# Patient Record
Sex: FEMALE | Race: OTHER | HISPANIC OR LATINO
[De-identification: names, ages, dates, MRNs, and addresses within clinical notes are randomized per-mention and may not be internally consistent; named-entity substitution may affect disease eponyms.]

---

## 2017-03-24 ENCOUNTER — APPOINTMENT (OUTPATIENT)
Dept: OPHTHALMOLOGY | Facility: CLINIC | Age: 57
End: 2017-03-24

## 2017-03-24 DIAGNOSIS — H35.012: ICD-10-CM

## 2017-03-24 DIAGNOSIS — H11.31 CONJUNCTIVAL HEMORRHAGE, RIGHT EYE: ICD-10-CM

## 2017-11-10 ENCOUNTER — RESULT REVIEW (OUTPATIENT)
Age: 57
End: 2017-11-10

## 2018-11-20 ENCOUNTER — RESULT REVIEW (OUTPATIENT)
Age: 58
End: 2018-11-20

## 2019-06-21 ENCOUNTER — EMERGENCY (EMERGENCY)
Facility: HOSPITAL | Age: 59
LOS: 1 days | Discharge: HOME | End: 2019-06-21
Attending: EMERGENCY MEDICINE
Payer: COMMERCIAL

## 2019-06-21 VITALS — HEIGHT: 65 IN | WEIGHT: 200.18 LBS

## 2019-06-21 VITALS
RESPIRATION RATE: 17 BRPM | SYSTOLIC BLOOD PRESSURE: 147 MMHG | HEART RATE: 105 BPM | WEIGHT: 190.04 LBS | HEIGHT: 64 IN | OXYGEN SATURATION: 96 % | DIASTOLIC BLOOD PRESSURE: 97 MMHG | TEMPERATURE: 98 F

## 2019-06-21 PROCEDURE — 90471 IMMUNIZATION ADMIN: CPT

## 2019-06-21 PROCEDURE — 90675 RABIES VACCINE IM: CPT

## 2019-06-21 PROCEDURE — 90375 RABIES IG IM/SC: CPT

## 2019-06-21 PROCEDURE — 99283 EMERGENCY DEPT VISIT LOW MDM: CPT

## 2019-06-21 PROCEDURE — 96372 THER/PROPH/DIAG INJ SC/IM: CPT

## 2019-06-21 PROCEDURE — 99283 EMERGENCY DEPT VISIT LOW MDM: CPT | Mod: 25

## 2019-06-21 PROCEDURE — 90715 TDAP VACCINE 7 YRS/> IM: CPT

## 2019-06-21 RX ORDER — RABIES VACC, HUMAN DIPLOID/PF 2.5 UNIT
1 VIAL (EA) INTRAMUSCULAR ONCE
Refills: 0 | Status: COMPLETED | OUTPATIENT
Start: 2019-06-21 | End: 2019-06-21

## 2019-06-21 RX ORDER — TETANUS TOXOID, REDUCED DIPHTHERIA TOXOID AND ACELLULAR PERTUSSIS VACCINE, ADSORBED 5; 2.5; 8; 8; 2.5 [IU]/.5ML; [IU]/.5ML; UG/.5ML; UG/.5ML; UG/.5ML
0.5 SUSPENSION INTRAMUSCULAR ONCE
Refills: 0 | Status: COMPLETED | OUTPATIENT
Start: 2019-06-21 | End: 2019-06-21

## 2019-06-21 RX ORDER — HUMAN RABIES VIRUS IMMUNE GLOBULIN 150 [IU]/ML
1816 INJECTION, SOLUTION INTRAMUSCULAR ONCE
Refills: 0 | Status: COMPLETED | OUTPATIENT
Start: 2019-06-21 | End: 2019-06-21

## 2019-06-21 RX ADMIN — TETANUS TOXOID, REDUCED DIPHTHERIA TOXOID AND ACELLULAR PERTUSSIS VACCINE, ADSORBED 0.5 MILLILITER(S): 5; 2.5; 8; 8; 2.5 SUSPENSION INTRAMUSCULAR at 19:45

## 2019-06-21 RX ADMIN — Medication 1 TABLET(S): at 19:44

## 2019-06-21 RX ADMIN — HUMAN RABIES VIRUS IMMUNE GLOBULIN 1816 UNIT(S): 150 INJECTION, SOLUTION INTRAMUSCULAR at 20:24

## 2019-06-21 RX ADMIN — Medication 1 MILLILITER(S): at 19:47

## 2019-06-21 NOTE — ED PROVIDER NOTE - PHYSICAL EXAMINATION
Attending note. Patient is alert and in no acute distress. Patient has a superficial laceration of the tip of the right fourth finger. There is no swelling, erythema. There is minimal tenderness. There is no ascending lymphangitis. She has full range of motion of the digit.

## 2019-06-21 NOTE — ED ADULT NURSE NOTE - OBJECTIVE STATEMENT
59F aaox4 ambulatory with h/o RA on Enbrel Methotrexate, Folic acid and aciphex and Lexapro p/w an animal bite on her rt 4th finger an hour ago by a stray kitten. Reports it was bleeding but eventually stopped. Reports that she wash her hands and applied alcohol and hydrogen peroxide. Unknown Tetanus vaccine status.

## 2019-06-21 NOTE — ED PROVIDER NOTE - NSFOLLOWUPINSTRUCTIONS_ED_ALL_ED_FT
Return next Monday for rabies vaccination #2. Return next Friday for rabies vaccination #3, and return the following Friday for rabies vaccines #4-the final vaccines. Take Augmentin as prescribed. Return to the emergency department if increased redness, swelling or redness streaks up the right hand. Tylenol or ibuprofen for pain if needed. Keep hand elevated for the next 2 days.  Return to the emergency department for a wound check if concerns for infection.

## 2019-06-21 NOTE — ED PROVIDER NOTE - OBJECTIVE STATEMENT
Attending note. Patient was seen and last track room #3. Patient was bitten on the tip of the right ring finger. Patient states she clean the wound with water, soap, peroxide, alcohol. Patient was seen at her  center and sent to the emergency department for rabies prophylaxis. Patient states her tetanus is up-to-date. She has no allergies. Bite occurred in [place].

## 2019-06-21 NOTE — ED PROVIDER NOTE - CLINICAL SUMMARY MEDICAL DECISION MAKING FREE TEXT BOX
Attending note. CAT bite right fourth finger tip. Rabies immunoglobulin and rabies vaccines. Tetanus is up-to-date. Augmentin. Vaccination schedule was discussed with the patient.

## 2019-06-24 ENCOUNTER — EMERGENCY (EMERGENCY)
Facility: HOSPITAL | Age: 59
LOS: 1 days | Discharge: HOME | End: 2019-06-24
Attending: EMERGENCY MEDICINE
Payer: COMMERCIAL

## 2019-06-24 VITALS
HEIGHT: 64 IN | TEMPERATURE: 98 F | DIASTOLIC BLOOD PRESSURE: 72 MMHG | RESPIRATION RATE: 20 BRPM | SYSTOLIC BLOOD PRESSURE: 134 MMHG | HEART RATE: 100 BPM | WEIGHT: 198.42 LBS | OXYGEN SATURATION: 97 %

## 2019-06-24 PROCEDURE — 99283 EMERGENCY DEPT VISIT LOW MDM: CPT

## 2019-06-24 PROCEDURE — 90471 IMMUNIZATION ADMIN: CPT

## 2019-06-24 PROCEDURE — 99284 EMERGENCY DEPT VISIT MOD MDM: CPT | Mod: 25

## 2019-06-24 PROCEDURE — 90675 RABIES VACCINE IM: CPT

## 2019-06-24 RX ORDER — RABIES VACC, HUMAN DIPLOID/PF 2.5 UNIT
1 VIAL (EA) INTRAMUSCULAR ONCE
Refills: 0 | Status: COMPLETED | OUTPATIENT
Start: 2019-06-24 | End: 2019-06-24

## 2019-06-24 RX ADMIN — Medication 1 MILLILITER(S): at 17:42

## 2019-06-24 NOTE — ED PROVIDER NOTE - NSFOLLOWUPINSTRUCTIONS_ED_ALL_ED_FT
REst, increase activity as tolerated  REturn to the ER FRIDAY for thrid vaccine and then the next FRIDAY\  -- Please use 650mg Tylenol (also called acetaminophen) every 4 hours & 600mg Motrin (also called Advil or ibuprofen) every 6 hours as needed for pain/discomfort/swelling. You can get these without a prescription. Don't use more than 3500mg of Tylenol in any 24-hour period. Make sure your other prescription/over-the-counter medications don't contain any Tylenol so you don't take too much. If you have any stomach discomfort while taking Motrin, you can use TUMS or Pepcid or Zantac (these can all be bought without a prescription).

## 2019-06-24 NOTE — ED PROVIDER NOTE - CLINICAL SUMMARY MEDICAL DECISION MAKING FREE TEXT BOX
s/p cat bite 6/21-   pt here for 2/4 of rabies vaccine- no s/s of infection   REturn friday for 3/4 s/p cat bite 6/21-   pt here for 2/4 of rabies vaccine- no s/s of infection   REturn friday for 3/4    Irma: See attending statement below

## 2019-06-24 NOTE — ED PROVIDER NOTE - ATTENDING CONTRIBUTION TO CARE
59y F here for cat bite/rabies FU. Pt seen 6/21 after being bitten by stray cat on R ring finger. Pt received rabies vaccine, immunoglobulin, abx, Tdap. Here today for rabies vaccine 2/4. No si or sx of infection. Still taking abx. To return for shot 3/4 on Friday.

## 2019-06-24 NOTE — ED ADULT NURSE NOTE - OBJECTIVE STATEMENT
Pt presents for next Rabies vaccination after sustaining a bite from a stray cat last week to the tip of her left 4th finger..  Denies fevers, chills.

## 2019-06-25 PROBLEM — M06.9 RHEUMATOID ARTHRITIS, UNSPECIFIED: Chronic | Status: ACTIVE | Noted: 2019-06-21

## 2019-06-28 ENCOUNTER — EMERGENCY (EMERGENCY)
Facility: HOSPITAL | Age: 59
LOS: 1 days | Discharge: HOME | End: 2019-06-28
Attending: EMERGENCY MEDICINE
Payer: COMMERCIAL

## 2019-06-28 VITALS
HEIGHT: 64 IN | TEMPERATURE: 98 F | SYSTOLIC BLOOD PRESSURE: 140 MMHG | WEIGHT: 190.04 LBS | DIASTOLIC BLOOD PRESSURE: 83 MMHG | OXYGEN SATURATION: 95 % | RESPIRATION RATE: 16 BRPM | HEART RATE: 96 BPM

## 2019-06-28 PROCEDURE — 99283 EMERGENCY DEPT VISIT LOW MDM: CPT | Mod: 25

## 2019-06-28 PROCEDURE — 90675 RABIES VACCINE IM: CPT

## 2019-06-28 PROCEDURE — 90471 IMMUNIZATION ADMIN: CPT

## 2019-06-28 PROCEDURE — 99282 EMERGENCY DEPT VISIT SF MDM: CPT

## 2019-06-28 RX ORDER — RABIES VACC, HUMAN DIPLOID/PF 2.5 UNIT
1 VIAL (EA) INTRAMUSCULAR ONCE
Refills: 0 | Status: COMPLETED | OUTPATIENT
Start: 2019-06-28 | End: 2019-06-28

## 2019-06-28 RX ADMIN — Medication 1 MILLILITER(S): at 09:29

## 2019-06-28 NOTE — ED PROVIDER NOTE - PHYSICAL EXAMINATION
**ATTENDING ADDENDUM (Dr. Alberto Gaines): I have reviewed and substantially contributed to the elements of the PE as documented above. I have directly performed an examination of this patient in conjunction with the other members (EM resident/PA/NP) of the patient care team. Of note, and in addition to the above, there is full range of motion of the right ring finger (flexion, extension). NO expanding erythema or warmth. NO effusion or soft-tissue swelling. NO "sausage-shaped" soft-tissue swelling. NO pain with range of motion or with passive extension. NO tenderness along the flexor tendon sheath. NO distal discoloration.

## 2019-06-28 NOTE — ED PROVIDER NOTE - PROGRESS NOTE DETAILS
**ATTENDING ADDENDUM (Dr. Alberto Gaines): Extensive anticipatory guidance provided to patient +/or family member(s) regarding need for next dose of rabies postexposure prophylaxis on day #14 (next Friday). Will continue to observe and monitor closely until vaccination is administered.

## 2019-06-28 NOTE — ED PROVIDER NOTE - CLINICAL SUMMARY MEDICAL DECISION MAKING FREE TEXT BOX
**ATTENDING MEDICAL DECISION MAKING/SYNTHESIS (Dr. Alberto Gaines): I have reviewed the Chief Complaint, the HPI, the ROS, and have directly performed and confirmed the findings on the Physical Examination. I have reviewed the medical decision making with all providers, as applicable. The PROBLEM REPRESENTATION at this time is: 59-year-old right-hand dominant woman one week s/p cat bite from cat (undomesticated) with unknown vaccination status for third dose of rabies vaccine. Received first two doses according to CDC recommendations (day 0 and day 3). Here for day 7 vaccination. Also received amoxicillin-clavulanate oral tablets for one week. NO reported fevers, chills, streaking, soft-tissue swelling, expanding erythema, pain, or tenderness. Right-hand dominant. Bite is a puncture on the volar aspect of the distal tuft of the right ring finger. The MOST LIKELY DIAGNOSIS, and the LIST OF DIFFERENTIAL DIAGNOSES, includes (but is not limited to) the following: worsening bite/infection, flexor tenosynovitis (NO evidence), occult foreign body or fracture (NO evidence), pain (NO evidence), compartment syndrome (NO evidence). The likelihood of each of these diagnoses has been appropriately considered in the context of this patient's presentation and my evaluation. PLAN: as described in EMR, including diagnostics, therapeutics and consultation as clinically warranted. I will continue to reevaluate the patient, including the results of all testing, and monitor response to therapy throughout the patient's course in the ED.

## 2019-06-28 NOTE — ED PROVIDER NOTE - CHIEF COMPLAINT
The patient is a 59y Female for rabies follow up (vaccine #3 of 4 total doses, according to CDC recommendations)

## 2019-06-28 NOTE — ED PROVIDER NOTE - MUSCULOSKELETAL [+], MLM
**ATTENDING ADDENDUM (Dr. Alberto Gaines): POSITIVE history of puncture wound to the volar tuft of the distal right ring finger. NO pain, tenderness, soft-tissue swelling, or streakign reported. Minimal-to-NO erythema reported (proximate to healing puncture site).

## 2019-06-28 NOTE — ED PROVIDER NOTE - OBJECTIVE STATEMENT
**ATTENDING OBJECTIVE STATEMENT (Dr. Alberto Gaines): I have reviewed this note, the presenting symptoms, and the Chief Complaint and the History of Present Illness as documented, with the other care provider(s) and nurses on the patient care team. I have also reviewed this patient's past medical/surgical history and social/family history as reviewed and listed in this electronic medical record. Patient is a 59-year-old woman one week s/p cat bite from cat (undomesticated) with unknown vaccination status for third dose of rabies vaccine. Received first two doses according to CDC recommendations (day 0 and day 3). Here for day 7 vaccination. Also received amoxicillin-clavulanate oral tablets for one week. NO reported fevers, chills, streaking, soft-tissue swelling, expanding erythema, pain, or tenderness. Right-hand dominant. Bite is a puncture on the volar aspect of the distal tuft of the right ring finger. Right hand dominant.

## 2019-06-28 NOTE — ED PROVIDER NOTE - PLAN OF CARE
**ATTENDING ADDENDUM (Dr. Alberto Gaines): Goals of care include resolution of emergent/urgent symptoms and concerns, and restoration to baseline level of homeostasis.

## 2019-06-28 NOTE — ED PROVIDER NOTE - NSFOLLOWUPINSTRUCTIONS_ED_ALL_ED_FT
(1) anticipatory guidance provided  (2) rest  (3) outpatient follow-up with your primary care physician/provider (4) return if symptoms worsen, persist, or do not resolve (5) medications, if indicated, as prescribed (finish last dose of amoxicillin-clavulanate as prescribed today) (6) return in one week for dose #4 (day 14)

## 2019-06-28 NOTE — ED PROVIDER NOTE - ATTENDING CONTRIBUTION TO CARE
**ATTENDING ADDENDUM (Dr. Alberto Gaines): I attest that I have directly examined this patient and reviewed and formulated the diagnostic and therapeutic management plan in collaboration with the EM resident. Please see MDM note and remainder of EMR for findings from CC, HPI, ROS, and PE. (Michael)

## 2019-06-28 NOTE — ED ADULT NURSE NOTE - OBJECTIVE STATEMENT
0900 59 yr old female here for 3rd rabies shot. s/p cat bite. No c/o pain or fever. A&Ox4. ambulatory

## 2019-06-28 NOTE — ED ADULT NURSE NOTE - CADM POA CENTRAL LINE
Attempted to call mother *2 this date at 10:45 AM and 12:35 PM this date to inform mother patient is  visits on [place]. Patient had [facility], however paperwork that is on file states it  in 2017. Calling to confirm if child still has [facility] and if mother can bring in letter. Left message.     Kassi Davis, MOT, OTR/L
No

## 2019-06-28 NOTE — ED PROVIDER NOTE - CARE PLAN
Principal Discharge DX:	Rabies exposure  Goal:	**ATTENDING ADDENDUM (Dr. Alberto Gaines): Goals of care include resolution of emergent/urgent symptoms and concerns, and restoration to baseline level of homeostasis.  Secondary Diagnosis:	Puncture wound

## 2019-06-28 NOTE — ED PROVIDER NOTE - CHPI ED SYMPTOMS NEG
no bleeding at site/no inflammation/no bleeding/no drainage/no pain/no fever/no purulent drainage/no red streaks/no chills

## 2019-07-05 ENCOUNTER — EMERGENCY (EMERGENCY)
Facility: HOSPITAL | Age: 59
LOS: 1 days | Discharge: HOME | End: 2019-07-05
Attending: EMERGENCY MEDICINE
Payer: COMMERCIAL

## 2019-07-05 VITALS
HEART RATE: 99 BPM | WEIGHT: 198.42 LBS | HEIGHT: 64 IN | OXYGEN SATURATION: 96 % | RESPIRATION RATE: 18 BRPM | SYSTOLIC BLOOD PRESSURE: 100 MMHG | TEMPERATURE: 98 F | DIASTOLIC BLOOD PRESSURE: 79 MMHG

## 2019-07-05 PROCEDURE — 90471 IMMUNIZATION ADMIN: CPT

## 2019-07-05 PROCEDURE — 99282 EMERGENCY DEPT VISIT SF MDM: CPT

## 2019-07-05 PROCEDURE — 90675 RABIES VACCINE IM: CPT

## 2019-07-05 PROCEDURE — 99283 EMERGENCY DEPT VISIT LOW MDM: CPT | Mod: 25

## 2019-07-05 RX ORDER — RABIES VACC, HUMAN DIPLOID/PF 2.5 UNIT
1 VIAL (EA) INTRAMUSCULAR ONCE
Refills: 0 | Status: COMPLETED | OUTPATIENT
Start: 2019-07-05 | End: 2019-07-05

## 2019-07-05 RX ADMIN — Medication 1 MILLILITER(S): at 20:56

## 2019-07-05 NOTE — ED PROVIDER NOTE - NSFOLLOWUPINSTRUCTIONS_ED_ALL_ED_FT
Keep wound clean and dry, washing with soap and water.  Follow up with your Dr. for reevaluation.   Return for any concerns or worsening symptoms.

## 2019-07-05 NOTE — ED PROVIDER NOTE - ATTENDING CONTRIBUTION TO CARE
I have seen and evaluated this patient with the advance practice clinician.   I agree with the findings  unless other wise stated. I have amended notes where needed.  After my face to face bedside evaluation, I am noting: for last active vaccine.

## 2019-11-22 ENCOUNTER — RESULT REVIEW (OUTPATIENT)
Age: 59
End: 2019-11-22

## 2020-04-25 ENCOUNTER — TRANSCRIPTION ENCOUNTER (OUTPATIENT)
Age: 60
End: 2020-04-25

## 2020-04-27 ENCOUNTER — APPOINTMENT (OUTPATIENT)
Dept: ORTHOPEDIC SURGERY | Facility: CLINIC | Age: 60
End: 2020-04-27
Payer: MEDICAID

## 2020-04-27 ENCOUNTER — APPOINTMENT (OUTPATIENT)
Dept: ORTHOPEDIC SURGERY | Facility: CLINIC | Age: 60
End: 2020-04-27

## 2020-04-27 VITALS — SYSTOLIC BLOOD PRESSURE: 114 MMHG | DIASTOLIC BLOOD PRESSURE: 78 MMHG | HEART RATE: 89 BPM | TEMPERATURE: 98.7 F

## 2020-04-27 PROCEDURE — 99203 OFFICE O/P NEW LOW 30 MIN: CPT

## 2020-04-27 RX ORDER — OXYCODONE AND ACETAMINOPHEN 5; 325 MG/1; MG/1
5-325 TABLET ORAL
Qty: 20 | Refills: 0 | Status: ACTIVE | COMMUNITY
Start: 2020-04-27 | End: 1900-01-01

## 2020-04-27 NOTE — DISCUSSION/SUMMARY
[de-identified] : 60-year-old female with right knee Schatzker 1 tibial plateau fracture\par \par Patient presents with an acute injury to the right knee consistent with a split lateral tibial plateau fracture consistent with a Schatzker 1 fracture pattern.  There is minimal to no displacement, and given severe lateral compartmental arthrosis, recommendation is for conservative management.  Discussed with patient that this would likely include nonweightbearing for 6 to 8 weeks until bony union, with then progressive weightbearing at that time.  Physical therapy will begin in 2 weeks for passive and active range of motion to tolerance.  Patient voiced understanding regarding injury as well as associated arthrosis.  There is high risk of additional arthrosis given injury patent, which may necessitate future arthroplasty.\par \par Recommendation: As above.  Medication prescription provided.  Ice/elevate.  Nonweightbearing.  [place] bracing as instructed.\par \par Follow-up 2 weeks for repeat imaging

## 2020-04-27 NOTE — PHYSICAL EXAM
[de-identified] : ·	Oriented to time, place, person\par ·	Mood: Normal\par ·	Affect: Normal\par ·	Appearance: Healthy, well appearing, no acute distress.\par ·	Gait: Normal\par ·	Assistive Devices: None\par \par Right knee exam\par \par ·	Skin: Clean, dry, intact\par ·	Inspection: Valgus deformity, no masses, moderate swelling, large effusion\par ·	Pulses: 2+ DP/PT pulses\par ·	ROM: Minimal range of motion given pain\par ·	Tenderness: Tender throughout knee, pain through the lateral joint.\par ·	Stability: Stable\par ·	Strength: Intact Q/H/TA/GS/EHL, without atrophy\par ·	Neuro: In tact to light touch throughout

## 2020-05-10 ENCOUNTER — TRANSCRIPTION ENCOUNTER (OUTPATIENT)
Age: 60
End: 2020-05-10

## 2020-05-18 ENCOUNTER — APPOINTMENT (OUTPATIENT)
Dept: ORTHOPEDIC SURGERY | Facility: CLINIC | Age: 60
End: 2020-05-18
Payer: MEDICAID

## 2020-05-18 VITALS — TEMPERATURE: 97.8 F

## 2020-05-18 PROCEDURE — 99213 OFFICE O/P EST LOW 20 MIN: CPT

## 2020-05-18 PROCEDURE — 73564 X-RAY EXAM KNEE 4 OR MORE: CPT | Mod: RT
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
## 2020-05-19 NOTE — PHYSICAL EXAM
[de-identified] : ·	Oriented to time, place, person\par ·	Mood: Normal\par ·	Affect: Normal\par ·	Appearance: Healthy, well appearing, no acute distress.\par ·	Gait: Antalgic\par ·	Assistive Devices: Cane\par \par Right knee exam\par \par ·	Skin: Clean, dry, intact\par ·	Inspection: Valgus deformity, no masses, moderate swelling, moderate effusion\par ·	Pulses: 2+ DP/PT pulses\par ·	ROM: 15-90\par ·	Tenderness: Tender throughout knee, pain through the lateral joint.\par ·	Stability: Stable\par ·	Strength: Intact Q/H/TA/GS/EHL, without atrophy\par ·	Neuro: In tact to light touch throughout [de-identified] : \par The following radiographs were ordered and read by me during this patients visit. I reviewed each radiograph in detail with the patient and discussed the findings as highlighted below. \par \par 4 views of the right knee were obtained today that show no further displacement of split fracture lateral tibial plateau.  Severe arthrosis lateral compartment, moderate patellofemoral.  Valgus deformity

## 2020-05-19 NOTE — DISCUSSION/SUMMARY
[de-identified] : 60-year-old female with right knee Schatzker 1 tibial plateau fracture\par \par Patient's pain is improved, but continues to have pain with range of motion and weightbearing.  I again discussed treatment of plateau fractures with nonweightbearing until fracture union.  Would recommend continued nonweightbearing for an additional 1 month.  Follow-up in 1 month for further x-rays.\par \par At this time, recommendation is for physical therapy to improve range of motion and function.  Continue [place] bracing.\par \par Follow-up 4 weeks.\par

## 2020-06-18 ENCOUNTER — APPOINTMENT (OUTPATIENT)
Dept: ORTHOPEDIC SURGERY | Facility: CLINIC | Age: 60
End: 2020-06-18
Payer: MEDICAID

## 2020-06-18 VITALS — TEMPERATURE: 97.5 F

## 2020-06-18 PROCEDURE — 73564 X-RAY EXAM KNEE 4 OR MORE: CPT | Mod: RT

## 2020-06-18 PROCEDURE — 99213 OFFICE O/P EST LOW 20 MIN: CPT

## 2020-06-18 NOTE — DISCUSSION/SUMMARY
[de-identified] : 60-year-old female with right knee Schatzker 1 tibial plateau fracture\par \par Continued improvement with her right knee function.  Patient is approximately 6 weeks from injury, and there appears to be good union of the fracture fragments without further displacement.  Patient likely has some exacerbation of underlying osteoarthritis, and I discussed potential need for additional treatment following union of the fracture fragments.\par \par Continue physical therapy to improve range of motion and function.  Discontinue [place] bracing.  Ice/NSAIDs PRN\par \par Follow-up 2 months\par

## 2020-06-18 NOTE — PHYSICAL EXAM
[de-identified] : ·	Oriented to time, place, person\par ·	Mood: Normal\par ·	Affect: Normal\par ·	Appearance: Healthy, well appearing, no acute distress.\par ·	Gait: Antalgic\par ·	Assistive Devices: Cane\par \par Right knee exam\par \par ·	Skin: Clean, dry, intact\par ·	Inspection: Valgus deformity, no masses, moderate swelling, min effusion\par ·	Pulses: 2+ DP/PT pulses\par ·	ROM: 0-120\par ·	Tenderness: Tender throughout knee, pain through the lateral joint.\par ·	Stability: Stable\par ·	Strength: Intact Q/H/TA/GS/EHL, without atrophy\par ·	Neuro: In tact to light touch throughout [de-identified] : \par The following radiographs were ordered and read by me during this patients visit. I reviewed each radiograph in detail with the patient and discussed the findings as highlighted below. \par \par 4 views of the right knee were obtained today that show no further displacement of split fracture lateral tibial plateau.  Severe arthrosis lateral compartment, moderate patellofemoral.  Valgus deformity.

## 2020-06-18 NOTE — HISTORY OF PRESENT ILLNESS
[de-identified] : 60 year old female presents presents today for follow up of right tibial plateau fx sustained 4/24/20.  She is has not been compliant with [place] brace. She is attending PT 2 x per week and reports significant improvement.through the lateral aspect of the knee reports pain with walking and knee flexion. Taking Advil for pain. Denies buckling, catching, numbness or tingling.

## 2020-07-07 ENCOUNTER — TRANSCRIPTION ENCOUNTER (OUTPATIENT)
Age: 60
End: 2020-07-07

## 2020-07-10 ENCOUNTER — RX RENEWAL (OUTPATIENT)
Age: 60
End: 2020-07-10

## 2020-07-10 RX ORDER — DICLOFENAC SODIUM 10 MG/G
1 GEL TOPICAL DAILY
Qty: 100 | Refills: 0 | Status: ACTIVE | COMMUNITY
Start: 2020-06-18 | End: 1900-01-01

## 2020-08-18 ENCOUNTER — APPOINTMENT (OUTPATIENT)
Dept: ORTHOPEDIC SURGERY | Facility: CLINIC | Age: 60
End: 2020-08-18
Payer: MEDICAID

## 2020-08-18 PROCEDURE — 73564 X-RAY EXAM KNEE 4 OR MORE: CPT | Mod: RT

## 2020-08-18 PROCEDURE — 99213 OFFICE O/P EST LOW 20 MIN: CPT

## 2020-08-20 ENCOUNTER — APPOINTMENT (OUTPATIENT)
Dept: ORTHOPEDIC SURGERY | Facility: CLINIC | Age: 60
End: 2020-08-20

## 2020-08-20 NOTE — HISTORY OF PRESENT ILLNESS
[de-identified] : 60 year old female presents presents today for follow up of right tibial plateau fx sustained 4/24/20. She reports improvement of pain since last visit. Attending PT 2 x per week.  The pain is through the lateral aspect of the knee reports pain with walking and knee flexion. Taking Advil for pain. Denies buckling, catching, numbness or tingling.  Patient has some questions regarding arthroplasty and potential future surgical intervention.

## 2020-08-20 NOTE — DISCUSSION/SUMMARY
[de-identified] : 60-year-old female with right knee Schatzker 1 tibial plateau fracture\par \par No further displacement with evidence of union on x-ray imaging.  Patient has valgus deformity of the knee, with severe advancing arthrosis within the lateral compartment.  Discussion was had with the patient regarding future arthroplasty.  Patient may be a good candidate for arthroplasty in the near future, as her pain has continued throughout the lateral compartment despite union of the fracture.\par \par HEP. Ice/NSAIDs PRN\par \par Follow-up 6 months \par

## 2020-08-20 NOTE — PHYSICAL EXAM
[de-identified] : \par The following radiographs were ordered and read by me during this patients visit. I reviewed each radiograph in detail with the patient and discussed the findings as highlighted below. \par \par 4 views of the right knee were obtained today that show healed split fracture lateral tibial plateau.  Severe arthrosis lateral compartment, moderate patellofemoral.  Valgus deformity.  [de-identified] : ·	Oriented to time, place, person\par ·	Mood: Normal\par ·	Affect: Normal\par ·	Appearance: Healthy, well appearing, no acute distress.\par ·	Gait: Antalgic\par ·	Assistive Devices: Cane\par \par Right knee exam\par \par ·	Skin: Clean, dry, intact\par ·	Inspection: Valgus deformity, no masses, moderate swelling, min effusion\par ·	Pulses: 2+ DP/PT pulses\par ·	ROM: 0-120\par ·	Tenderness: Tender throughout knee, pain through the lateral joint.\par ·	Stability: Stable\par ·	Strength: Intact Q/H/TA/GS/EHL, without atrophy\par ·	Neuro: In tact to light touch throughout

## 2020-11-24 ENCOUNTER — RESULT REVIEW (OUTPATIENT)
Age: 60
End: 2020-11-24

## 2021-02-08 ENCOUNTER — APPOINTMENT (OUTPATIENT)
Dept: ORTHOPEDIC SURGERY | Facility: CLINIC | Age: 61
End: 2021-02-08
Payer: MEDICAID

## 2021-02-08 VITALS — TEMPERATURE: 97.3 F

## 2021-02-08 PROCEDURE — 73564 X-RAY EXAM KNEE 4 OR MORE: CPT | Mod: RT

## 2021-02-08 PROCEDURE — 20610 DRAIN/INJ JOINT/BURSA W/O US: CPT | Mod: RT

## 2021-02-08 PROCEDURE — 99072 ADDL SUPL MATRL&STAF TM PHE: CPT

## 2021-02-08 PROCEDURE — 99214 OFFICE O/P EST MOD 30 MIN: CPT | Mod: 25

## 2021-02-08 NOTE — PROCEDURE
[de-identified] : Aspiration & Injection: Right knee joint.\par Indication: Effusion. \par \par A discussion was had with the patient regarding this procedure and all questions were answered. All risks, benefits and alternatives were discussed. These included but were not limited to bleeding, infection, allergic reaction and reaccumulation of fluid. Alcohol was used to clean the skin, and betadine was used to sterilize and prep the area in the supero-lateral aspect of the right knee. Ethyl chloride spray was then used as a topical anesthetic. An 18-gauge needle was used to aspirate the knee joint and approximately 20 cc of inflammatory fluid was aspirated from the right knee without complication. In addition, following the aspiration, an injection of 4cc of 1% lidocaine and 1cc of 40mg/ml methylprednisolone also inserted into the knee via the same needle. A sterile bandage was then applied. The patient tolerated the procedure well.

## 2021-02-08 NOTE — DISCUSSION/SUMMARY
[de-identified] : 59 y/o female with right knee OA\par \par Presentation is consistent with degenerative change and arthrosis to the knee exacerbated by recent injury. Described that this is likely due to overuse, and age-related "wear and tear". Pain likely related to breakdown of the chondral surfaces, cartilage, or ligaments of the knee. Patient was given aspiration and injection therapy for therapeutic and symptomatic relief of current effusion from recent trauma. \par \par Recommendation: Conservative care & observation; including rest/activity avoidance until less symptomatic with subsequent gradual return to full low impact activity as tolerated. Patient may also use OTC NSAIDs or acetaminophen as tolerated, with application of ice/heat to the area 2-3x daily for 20 minutes after periods of activity. \par \par Follow up as needed if pain persists for further evaluation and treatment.  May consider arthroplasty given degree of valgus deformity and underlying arthrosis.
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
## 2021-02-08 NOTE — ADDENDUM
[FreeTextEntry1] : This note was written by Sheila Diego on 02/08/2021 acting solely as a scribe for Dr. Patrick May.\par \par All medical record entries made by the Scribe were at my, Dr. Patrick May, direction and personally dictated by me on 02/08/2021. I have personally reviewed the chart and agree that the record accurately reflects my personal performance of the history, physical exam, assessment and plan.

## 2021-02-08 NOTE — REASON FOR VISIT
[Follow-Up Visit] : a follow-up visit for [Knee Pain] : knee pain [FreeTextEntry2] : Right knee injury

## 2021-02-08 NOTE — PHYSICAL EXAM
[de-identified] : ·Oriented to time, place, person\par ·Mood: Normal\par ·Affect: Normal\par ·Appearance: Healthy, well appearing, no acute distress.\par ·Gait: Antalgic\par ·Assistive Devices:None\par \par Right knee exam\par \par ·Skin: Clean, dry, intact\par ·Inspection: Valgus deformity, no masses, moderate swelling, moderate effusion\par ·Pulses: 2+ DP/PT pulses\par ·ROM: \par ·Tenderness: Tender throughout knee, pain through the lateral joint.\par ·Stability: Stable\par ·Strength: Intact Q/H/TA/GS/EHL, without atrophy\par ·Neuro: In tact to light touch throughout  [de-identified] : The following radiographs were ordered and read by me during this patients visit. I reviewed each radiograph in detail with the patient and discussed the findings as highlighted below. \par \par 4 views of the right knee were obtained today, 02/08/2021, that show no acute fracture or dislocation. There is moderate medial, severe lateral and moderate patellofemoral degenerative changes seen. There is no significant malalignment. No significant other obvious osseous abnormality, otherwise unremarkable.

## 2021-02-18 NOTE — ED PROVIDER NOTE - TOBACCO USE
The patient ASA 1 with a class II Mallampati airway.  The plans for this patient is for moderate sedation benzodiazepine namely Versed opioid namely fentanyl.  The patient's history and physical has been documented and reviewed.  The patient is suitable to undertake sedation.  The risks benefits as well as alternatives have been discussed with the patient/decision-maker.             Electronically Signed On 11.16.2018 14:18  ___________________________________________________   Yumiko Fowler MD    
Unknown if ever smoked

## 2021-09-10 NOTE — ED ADULT NURSE NOTE - NS ED NOTE  TALK SOMEONE YN
Referral for dermatology consult received from Dr. Remington Monk (oncology) for   Diagnosis   172.5 (ICD-9-CM) - C43.52 (ICD-10-CM) - Melanoma of female breast (CMS/HCC)   Referral Notes    Note    Previous melanoma diagnosis of left breast 2018. Needs to reestablish with derm for regular skin assessment                Referral notes from Dr. Monk, and Dr. Ibrahim's last office visit notes are below.    Encounter routed to Dr. Ibrahim for review and recommendation for visit scheduling, as first available is in December 2021.    _____________________________________        Office visit with Dr. Monk on 9/8/21 9/8/2021  [facility]     Remington Monk DO  Hematology & Oncology  Melanoma of female breast (CMS/HCC) +1 more  Dx  Office Visit  • Convey Results ; Referred by Remington Monk DO  Reason for Visit   Progress Notes  Remington Monk DO (Physician) • • Hematology & Oncology  Expand All Collapse All  [facility] FOLLOW UP NOTE  HEMATOLOGY AND ONCOLOGY     ASSESSMENT AND PLAN   In summary, 51 year old female with history of malignant melanoma of right breast status post mastectomy who presents today for follow-up visit        IMPRESSION/PLAN:  1. Malignant melanoma of the right breast TxN0 M1 status post mastectomy April 2018. BRAF negative.  a. Nivolumab  therapy since May 2018 - February 2019, discontinued after 16 cycles due to immunotherapy related rash.     Discussed clinical presentation, labs, imaging and overall management with patient.  Reviewed imaging showing no evidence of disease recurrence.  Patient should continue follow-up with Dermatology regarding skin exams.  We discussed age-appropriate cancer screening ongoing surveillance.  We will continue with surveillance imaging.  Follow-up left breast mammogram. Discussed supportive care including patient needs to wear a breast prosthesis and surgical bra s/p right mastectomy  secondary to melanoma.     1. Reviewed imaging  2. Continue surveillance  3. Follow-up with Dermatology regarding skin exams.  Follow-up records from Dermatology office.  4. Follow-up left breast mammogram  5. Discussed healthy diet and lifestyle  6. Discussed supportive care  7. Discussed age-appropriate cancer screening  8. Follow-up with PCP regarding cirrhotic appearing liver on imaging  9. Follow-up in 6 months or sooner if needed     Discussed with patient the natural history, course and prognosis of illness.    Therapeutic options discussed and explained.  Side effects, risks and benefits, and alternatives discussed.  Patient acknowledges understanding of disease and agrees with treatment plan.     All questions were answered satisfactorily. Patient is instructed to contact us should issue arise prior to her next scheduled appointment. I spent a total of 30 minutes on this case  reviewing documentation, interpreting results, updating H&P, face to face time and coordinating care.     Thank you for letting me participate in the care of this patient.     Remington Mokn, DO [facility]   HEMATOLOGY/ONCOLOGY SUMMARY  Malignant melanoma of the right breast TxN0 M1 status post mastectomy April 2018  BRAF negative  Nivolumab  therapy since May 2018 - February 2019, discontinued after 16 cycles due to immunotherapy related rash.           CHIEF COMPLAINT       Chief Complaint   Patient presents with   • Office Visit       Melanoma of female breast    • Convey Results       Ct chest abdomen pelvis         INTERVAL HISTORY  Etelvina Kenney patient presents for follow-up visit today.  She states she is doing well overall with no specific complaints. Patient denies chest pain, shortness of breath, fevers, chills, nausea, vomiting, abdominal pain, blood in urine or stool, focal neurological deficits, B symptoms, masses/adenopathy.     REVIEW  OF SYSTEMS  Ten-point review of systems documented by MA are reviewed and addressed.     CURRENT MEDICATIONS       Current Outpatient Medications   Medication Sig   • amphetamine-dextroamphetamine (Adderall) 20 MG tablet Take 1 tablet by mouth 3 times daily.   • buprenorphine-naLOXone (SUBOXONE FILM) 8-2 MG sublingual film Place 1 strip under the tongue daily.   • buprenorphine-naLOXone (SUBOXONE FILM) 12-3 MG sublingual film Place 0.5 strips under the tongue 2 times daily.   • desvenlafaxine (PRISTIQ) 50 MG 24 hr tablet Take 1 tablet by mouth daily.   • omeprazole (PriLOSEC) 40 MG capsule TAKE 1 CAPSULE BY MOUTH DAILY   • nystatin (MYCOSTATIN) 106481 UNIT/GM cream Apply topically 2 times daily. Pt takes PRN   • Cholecalciferol (Vitamin D) 125 MCG (5000 UT) Cap Take 1 capsule by mouth daily.   • vitamin B-12 (CYANOCOBALAMIN) 100 MCG tablet Take 1 tablet by mouth daily.   • metroNIDAZOLE (METROGEL) 1 % gel Apply topically daily.   • Insulin Lispro, 1 Unit Dial, (HumaLOG KwikPen) 100 UNIT/ML pen-injector Inject under the skin 15u before breakfast, 30u before lunch and dinner plus sliding scale 2:50>150.   • Trulicity 0.75 MG/0.5ML pen-injector ADMINISTER 0.5 ML UNDER THE SKIN EVERY 7 DAYS   • metFORMIN (GLUCOPHAGE-XR) 500 MG 24 hr tablet Take 2 tablets by mouth daily (with breakfast).   • insulin glargine (LANTUS SOLOSTAR) 100 UNIT/ML pen-injector Inject 60 Units into the skin 2 times daily.   • Lancets (ONETOUCH ULTRASOFT) Misc Use to test blood sugar four times daily   • blood glucose (ONE TOUCH ULTRA TEST) test strip Use to test blood sugar four times daily   • simvastatin (ZOCOR) 40 MG tablet Take 1 tablet by mouth nightly.   • naLOXone (NARCAN) 4 MG/0.1ML nasal spray Call 911. [place] the content of 1 device into 1 nostril. May repeat with 2nd device in alternate nostril if no response in 2-3 minutes.   • acetaminophen (TYLENOL) 500 MG tablet Take 1 tablet by mouth every 6 hours as needed for Pain.   • lisinopril  (ZESTRIL) 20 MG tablet TAKE 1 TABLET BY MOUTH DAILY   • ibuprofen (MOTRIN) 400 MG tablet TAKE 1 TABLET BY MOUTH EVERY 6 HOURS AS NEEDED FOR PAIN   • blood glucose meter Test blood sugar 4 times daily as directed. Diagnosis: Diabetes mellitus type 2 on insulin. Meter: pharmacist choice      No current facility-administered medications for this visit.         PAST MEDICAL HISTORY  History - past medical        Past Medical History:   Diagnosis Date   • Cerebral infarction (CMS/Formerly [facility]) 06/25/2016   • COPD (chronic obstructive pulmonary disease) (CMS/Formerly [facility])     • Diabetes mellitus (CMS/Formerly [facility])       Type 2   • Dyslipidemia     • Eczema     • Essential (primary) hypertension     • Melanoma (CMS/Formerly [facility])       right breast   • TIKA (obstructive sleep apnea)       not using CPAP   • Pulmonary embolism and infarction 1/29/11   • Pulmonary embolism, bilateral (CMS/Formerly [facility]) 1/28/2011                  SOCIAL HISTORY  Social History            Tobacco Use   • Smoking status: Current Every Day Smoker       Packs/day: 1.50       Years: 30.00       Pack years: 45.00       Types: Cigarettes   • Smokeless tobacco: Never Used   Vaping Use   • Vaping Use: never used   Substance Use Topics   • Alcohol use: No       Alcohol/week: 0.0 standard drinks   • Drug use: No         FAMILY HISTORY  History - family          Family History   Problem Relation Age of Onset   • Diabetes Sister     • Osteoarthritis Sister     • Diabetes Maternal Grandmother     • Hypertension Mother     • Osteoarthritis Mother     • Crohn's Disease Father     • Migraine Father     • Substance Abuse Father     • Cataracts Father              OBJECTIVES   PHYSICAL EXAMINATION      Oncology Encounter Vitals [09/08/21 1536]   ONC OP Encounter Vitals Group      BP (!) 153/82      Heart Rate 99      Resp        Temp 96.2 °F (35.7 °C)      Temp src Temporal      SpO2        Weight 220 lb 1.6 oz (99.8 kg)      Height 5' 5\" (1.651 m)      Pain Score  0      Pain Location        Pain  Education?        BSA (Calculated - m2) - [employer] 2.06      BMI (Calculated) 36.63         ECOG Performance Status               ECOG   ECOG Performance Status               GEN:  NAD, Well-Nourished, Very Pleasant  Eyes::  Normal eye movements. No scleral icterus or conjunctival pallor.    ENT:  Oropharynx reveals no exudate, thrush or ulcers or hemorrhages.    Neck:  Trachea midline.  No masses.  No lymphadenopathy.    Respiratory:  Normal chest expansion noted.  Auscultation reveals normal breath sounds bilaterally.   Cardiovascular:  Normal rhythm and rate.    Breast exam:  Patient refuse.  Abdomen:  Soft, nontender.  No masses felt.  No hepatosplenomegaly.    Musculoskeletal:  Gait normal.  No muscle or joint tenderness.    Neurologic:  No Acute focal neurological deficits. Cranial nerves intact.  No sensory deficit.  Moving all extremities spontaneously.   Skin:  No rashes, lesions, ulcers petechiae or hemorrhages on visualized areas.  Bilateral upper and lower extremities:  No clubbing, cyanosis or edema.     LABORATORY DATA       Results for orders placed or performed in visit on 09/08/21   ONCOLOGY PATHWAY DECISION   Result Value     VIA MED ONC PATHWAYS TAG [id number]            Lab Results   Component Value Date     WBC 4.9 08/17/2021     HGB 13.2 08/17/2021      (L) 08/17/2021     MCV 90.6 08/17/2021           Lab Results   Component Value Date     SODIUM 140 08/17/2021     POTASSIUM 3.6 08/17/2021     CHLORIDE 103 08/17/2021     CO2 28 08/17/2021     BUN 7 08/17/2021     CREATININE 0.48 (L) 08/17/2021     GLUCOSE 252 (H) 08/17/2021             Lab Results   Component Value Date      04/09/2021           Lab Results   Component Value Date     AST 44 (H) 08/17/2021     GPT 63 08/17/2021     ALKPT 93 08/17/2021     BILIRUBIN 0.4 08/17/2021     TOTPROTEIN 6.9 08/17/2021     ALBUMIN 3.2 (L) 08/17/2021                        ______________________        Last office visit Dr. Ibrahim on  04/05/21       DERMATOLOGY PMH:  Metastatic Melanoma right breast 2018 with nivolumab; no primary     PMH: The patient has a personal history of skin cancer  Yes.      PMH: The patient has a personal history of actinic keratoses Yes.      Biopsy date Cancer Type Subtype Location Treatment Treatment Date   03/2018 Melanoma   Right breast internal unknown primary Mastectomy  Current immunotherapy biweekly x 1 year  04/24/2018         FH:  The patient has a family history of skin cancer:  Yes - Father unknown           The patient uses sun block Yes. Not all the time, but tries to. SPF 50      SH: Caregiver      PE:     This is a 50 year old female in no acute distress, alert and oriented times 3 and appears well groomed.   Smells of cigarette smoke  She does have a crusted nodule growing on the right tragus (previous notes show that this was treated in the past with cryo surgery)  Tan crusted nodule 8 mm - Right tragus      Numerous tan to brown macules in areas of sun exposure on face      Dilated vessels with rosacea papules     Pink scaly papules:   Left cheek x 1  Left tip of the nose x 1      Dryness in the nasal creases      Assessment and Plan:     1.  Neoplasm of uncertain behavior of skin   Shave Excision:     Impression: Seborrheic keratosis    Location: Right tragus      Consent was obtained to excise the growth. It was prepped in a sterile fashion and anesthetized with lidocaine with epinephrine. The lesion was excised into the subcutaneous tissue to 11 mm. Drysol was applied and the base was cauterized and wound care was discussed. The patient will be notified of histology and should follow up pending pathology results.        2. History of Melanoma   Patient with a history of melanoma, located on the right breast with unknown primary s/p mastectomy 04/24/2018 and currently nivolumab until May 2019, discontinued due to a rash  -    -  Patient was counseled to perform self skin exams on a monthly basis.  -   Physician skin exam recommended each year  -  FBSE (full body skin exam) is recommended  -  The patient was counseled on the A, B, C, D, E's of melanoma detection.  The patient is to examine the skin on a monthly basis, and if they notice any concerning, they should return for re-evaluation.  -she has not been following up in oncology or for skin exams and I recommend she do so  She notes that she used a tanning bed quite a bit in the past     3  History of Immunosuppression  - Patient has a history of immunosuppression, chemotherapy or radiation: currently nivolumab with plan to be completed 05/2019  -  Sun-protective behavior discussed including the use of sunscreen SPF 30+ daily and reapplying at least every 2 hours.  Also discussed sun protective clothing including long sleeves and wide brimmed hats when anticipating sun exposure.  Avoid peak sun hours and seek shade from 10 a.m.-4 p.m. when possible  - Counseled regarding avoidance of tanning beds  -  Recommend monthly self skin exams to monitor for any concerning changes  -  RTC (return to clinic) sooner if any lesions have any new or concerning changes     4. Telangiectasias:  -  Reassurance  -  No necessary treatment.  Treatment for this condition is considered cosmetic.     5. Actinic Keratoses x 2 location: Left cheek and left tip of nose   -  The diagnosis was discussed.    -  I recommended destruction with liquid nitrogen and after a discussion of risks and benefits of liquid nitrogen treatment the patient gave oral consent for the same.  -  Therefore liquid nitrogen was used to destroy the lesion(s).  -  Wound care and expected healing process discussed.     Rosacea:  New chronic diagnosis  -  Discussed that treatment may take 3-4 months to notice improvement.  -  Counseled on sun precautions and avoiding known triggers of flares when possible.  -  Treatment as follows:             metrogel q.h.s. with sunblock in the day 15. Or more        The patient  will return to the clinic as needed, however was instructed to schedule a full body skin check with one of the other Dermatologists.      No

## 2021-12-03 ENCOUNTER — RESULT REVIEW (OUTPATIENT)
Age: 61
End: 2021-12-03

## 2022-08-04 ENCOUNTER — EMERGENCY (EMERGENCY)
Facility: HOSPITAL | Age: 62
LOS: 1 days | Discharge: HOME | End: 2022-08-04
Attending: EMERGENCY MEDICINE
Payer: COMMERCIAL

## 2022-08-04 VITALS
HEART RATE: 74 BPM | OXYGEN SATURATION: 96 % | DIASTOLIC BLOOD PRESSURE: 67 MMHG | RESPIRATION RATE: 17 BRPM | SYSTOLIC BLOOD PRESSURE: 119 MMHG

## 2022-08-04 VITALS
RESPIRATION RATE: 18 BRPM | DIASTOLIC BLOOD PRESSURE: 80 MMHG | HEIGHT: 64 IN | HEART RATE: 71 BPM | SYSTOLIC BLOOD PRESSURE: 139 MMHG | WEIGHT: 179.9 LBS | TEMPERATURE: 98 F | OXYGEN SATURATION: 97 %
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
## 2022-08-04 DIAGNOSIS — M79.601 PAIN IN RIGHT ARM: ICD-10-CM

## 2022-08-04 DIAGNOSIS — M25.561 PAIN IN RIGHT KNEE: ICD-10-CM

## 2022-08-04 DIAGNOSIS — M25.562 PAIN IN LEFT KNEE: ICD-10-CM

## 2022-08-04 DIAGNOSIS — S42.301A UNSPECIFIED FRACTURE OF SHAFT OF HUMERUS, RIGHT ARM, INITIAL ENCOUNTER FOR CLOSED FRACTURE: ICD-10-CM

## 2022-08-04 DIAGNOSIS — Z87.891 PERSONAL HISTORY OF NICOTINE DEPENDENCE: ICD-10-CM
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
## 2022-08-04 DIAGNOSIS — M06.9 RHEUMATOID ARTHRITIS, UNSPECIFIED: ICD-10-CM

## 2022-08-04 PROCEDURE — 99284 EMERGENCY DEPT VISIT MOD MDM: CPT

## 2022-08-04 PROCEDURE — 73080 X-RAY EXAM OF ELBOW: CPT

## 2022-08-04 PROCEDURE — 73060 X-RAY EXAM OF HUMERUS: CPT

## 2022-08-04 PROCEDURE — 99284 EMERGENCY DEPT VISIT MOD MDM: CPT | Mod: 25

## 2022-08-04 PROCEDURE — 73090 X-RAY EXAM OF FOREARM: CPT

## 2022-08-04 PROCEDURE — 73564 X-RAY EXAM KNEE 4 OR MORE: CPT | Mod: 26,LT

## 2022-08-04 PROCEDURE — 73090 X-RAY EXAM OF FOREARM: CPT | Mod: 26,LT

## 2022-08-04 PROCEDURE — 73030 X-RAY EXAM OF SHOULDER: CPT | Mod: 26,RT

## 2022-08-04 PROCEDURE — 73030 X-RAY EXAM OF SHOULDER: CPT

## 2022-08-04 PROCEDURE — 73564 X-RAY EXAM KNEE 4 OR MORE: CPT

## 2022-08-04 PROCEDURE — 71045 X-RAY EXAM CHEST 1 VIEW: CPT | Mod: 26

## 2022-08-04 PROCEDURE — 73060 X-RAY EXAM OF HUMERUS: CPT | Mod: 26,RT

## 2022-08-04 PROCEDURE — 96374 THER/PROPH/DIAG INJ IV PUSH: CPT

## 2022-08-04 PROCEDURE — 71045 X-RAY EXAM CHEST 1 VIEW: CPT

## 2022-08-04 PROCEDURE — 73080 X-RAY EXAM OF ELBOW: CPT | Mod: 26,RT

## 2022-08-04 RX ORDER — OXYCODONE HYDROCHLORIDE 5 MG/1
1 TABLET ORAL
Qty: 12 | Refills: 0
Start: 2022-08-04 | End: 2022-08-06

## 2022-08-04 RX ORDER — MORPHINE SULFATE 50 MG/1
4 CAPSULE, EXTENDED RELEASE ORAL ONCE
Refills: 0 | Status: DISCONTINUED | OUTPATIENT
Start: 2022-08-04 | End: 2022-08-04

## 2022-08-04 RX ORDER — OXYCODONE HYDROCHLORIDE 5 MG/1
5 TABLET ORAL ONCE
Refills: 0 | Status: DISCONTINUED | OUTPATIENT
Start: 2022-08-04 | End: 2022-08-04

## 2022-08-04 RX ADMIN — OXYCODONE HYDROCHLORIDE 5 MILLIGRAM(S): 5 TABLET ORAL at 20:00

## 2022-08-04 RX ADMIN — MORPHINE SULFATE 4 MILLIGRAM(S): 50 CAPSULE, EXTENDED RELEASE ORAL at 21:44

## 2022-08-04 NOTE — ED PROVIDER NOTE - PHYSICAL EXAMINATION
PHYSICAL EXAM:  CONSTITUTIONAL: Well appearing, awake, alert, oriented to person, place, time/situation and in no apparent distress.  HEAD: Atraumatic  EYES: Clear bilaterally, pupils equal, round and reactive to light.  ENMT: Airway patent, Nasal mucosa clear. Mouth with normal mucosa. Uvula is midline.   CARDIAC: Normal rate, regular rhythm. +S1/S2. No murmurs, rubs or gallops.  RESPIRATORY: Breathing unlabored. Breath sounds clear and equal bilaterally.  ABDOMEN:  Soft, nontender, nondistended. No rebound tenderness or guarding.  NEUROLOGICAL: Alert and oriented, no focal deficits, no motor or sensory deficits. Sensation intact x4 extremities.  SKIN: Skin warm and dry. No evidence of rashes or lesions.  MSK: limited ROM and tenderness pain to palpation R shoulder, humerus, forearm. + pain to palpation b/l Knees L>R.

## 2022-08-04 NOTE — ED ADULT NURSE NOTE - OBJECTIVE STATEMENT
61yo F with PMH RA, CHF, presents to ED c/o right shoulder and left knee pain. Pt states, "I was walking and my sandal got caught in the door and I fell. I tried to avoid falling on my right knee and I fell on my left knee and also hurt my right shoulder". Pt endorses 8/10 arm and knee pain, denies taking anything for the pain. Denies chest pain, SOB, N/V, headstrike, LOC, blood thinner use, headache, blurred/change in vision, fevers/chills. A&Ox3. Strong peripheral pulses. Pulse, motor, sensation intact to all 4 extremities. Neurologically intact and follows commands. Bruising and swelling to left knee. Skin, warm, dry, intact and normal for ethnicity. Family at bedside. Stretcher locked and in lowest position, side rails up. Pt instructed to notify RN if assistance is needed.

## 2022-08-04 NOTE — ED PROVIDER NOTE - DISPOSITION TYPE
"Requested Prescriptions   Pending Prescriptions Disp Refills     amLODIPine (NORVASC) 5 MG tablet [Pharmacy Med Name: AMLODIPINE  5MG  TAB]  Last Written Prescription Date:  11/3/17  Last Fill Quantity: 90,  # refills: 1   Last office visit: 2/27/2018 with prescribing provider:  Emil   Future Office Visit:     90 tablet      Sig: TAKE 1 TABLET BY MOUTH  DAILY    Calcium Channel Blockers Protocol  Passed    3/18/2018  4:07 AM       Passed - Blood pressure under 140/90 in past 12 months    BP Readings from Last 3 Encounters:   02/27/18 120/60   11/02/17 111/69   05/23/17 140/80                Passed - Recent (12 mo) or future (30 days) visit within the authorizing provider's specialty    Patient had office visit in the last 12 months or has a visit in the next 30 days with authorizing provider or within the authorizing provider's specialty.  See \"Patient Info\" tab in inbasket, or \"Choose Columns\" in Meds & Orders section of the refill encounter.           Passed - Patient is age 18 or older       Passed - No active pregnancy on record       Passed - Normal serum creatinine on file in past 12 months    Recent Labs   Lab Test 08/22/17   CR  0.91            Passed - No positive pregnancy test in past 12 months          "
Patient has refills remaining with pharmacy.  Camila Longoira RN - Triage  [facility]      
DISCHARGE

## 2022-08-04 NOTE — ED PROVIDER NOTE - CARE PROVIDER_API CALL
Rios Larson)  Orthopedics  [address] [place] [address]  Phone: [phone]  Fax: [phone]  Follow Up Time: 1-3 Days

## 2022-08-04 NOTE — ED PROVIDER NOTE - PROGRESS NOTE DETAILS
Consulted Dr. Sacha Meadows, he will come down to evaluate patient and reduce Attending MD Nolen: Ortho in the Emergency Department splinting patient

## 2022-08-04 NOTE — ED PROVIDER NOTE - CLINICAL SUMMARY MEDICAL DECISION MAKING FREE TEXT BOX
63 y/o female presenting after a fall. Pain to R arm and b/l knees. meds and imaging ordered. possible fracture vs. sprain vs. tear. will re-evaluate after imaging and meds.

## 2022-08-04 NOTE — ED PROVIDER NOTE - OBJECTIVE STATEMENT
61 y/o female got her sandal stuff on the door , tried avoiding falling on her R knee, fell on L knee and R arm. Pain in b/l knees and R arm. No LOC. Did not hit her head. Not on blood thinners. Patient took asa this morning. No systemic s/s.

## 2022-08-04 NOTE — ED PROVIDER NOTE - PATIENT PORTAL LINK FT
You can access the FollowMyHealth Patient Portal offered by [facility] by registering at the following website: [email or web address]. By joining [email or web address]’s FollowMyHealth portal, you will also be able to view your health information using other applications (apps) compatible with our system.

## 2022-08-04 NOTE — ED PROVIDER NOTE - ATTENDING CONTRIBUTION TO CARE
Attending MD Nolen: I personally have seen and examined this patient.  Resident note reviewed and agree on plan of care and except where noted.  See below for details.     seen in [place], accompanied by son    62F with PMH/PSH including RA presents to the ED with R upper arm pain after fall.  Reports that she tripped on her slippers and fell reports was trying to protect her R knee which hurts at baseline from her arthritis and landed with most of her weight on her L knee.  Reports when she fell her RUE "went back".  Reports had immediate severe pain to the R upper arm.  R hand dominant.  Denies preceding dizziness, weakness, sensory changes.  Denies LOC, hitting head. Denies numbness, weakness or tingling in extremities. Denies loss of urinary or bowel continence. Denies chest pain, shortness of breath, abdominal pain, nausea, vomiting, diarrhea, urinary complaints.     Exam:   General: NAD  HENT: head NCAT, airway patent   Eyes: no conjunctival injection, PERRL, EOMI  Lungs: lungs CTAB with good inspiratory effort, no wheezing, no rhonchi, no rales  Cardiac: +S1S2, no obvious m/r/g  GI: abdomen soft with +BS, NT, ND  : no CVAT  MSK: FROM at neck, no tenderness to midline palpation, no stepoffs along length of spine, RUE in sling, tenderness to palpation at humerus, mild tenderness only to R shoulder, elbow, forearm, FROM at R wrist and hand, +2 radials, cap refill <2s, +able to range bilateral knees, ROM R>L, L knee with edema and ecchymosis overlying patella, +2 DPs  Neuro: moving all extremities spontaneously, sensory grossly intact, no gross neuro deficits  Psych: normal mood and affect     A/P: 62F with RUE and bilateral knee pain, suspect humeral fracture, will obtain XR of R shoulder, humerus, elbow, forearm, will obtain bilateral knee XRs all to eval for bony injury, will give pain control, reassess

## 2022-08-04 NOTE — CONSULT NOTE ADULT - SUBJECTIVE AND OBJECTIVE BOX
62yFemale RHD c/o R am pain  s/p mech fall getting foot stuck in ground. Patient denies head hit or LOC. Patient denies numbness or tingling. Patient denies any other injuries.    ROS: 10 point ROS otherwise negative    PMH:  Rheumatoid arthritis      PSH:    AH:    Meds: See med rec    T(C): 36.7 (08-04-22 @ 19:55)  HR: 74 (08-04-22 @ 21:39)  BP: 119/67 (08-04-22 @ 21:39)  RR: 17 (08-04-22 @ 21:39)  SpO2: 96% (08-04-22 @ 21:39)  Wt(kg): --    Gen: NAD  Resp: Unlabored breathing  PE RUE:  Skin intact,    SILT axillary/med/rad/ulnar  +Motor AIN/PIN/Ulnar  +painless elbow/wrist ROM,   shoulder ROM limited 2/2 pain,   2+radial pulse, soft compartments.    Secondary:  No TTP over bony landmarks, SILT BL, ROM intact BL, distal pulses palpable.    Imaging:  XR demonstrating spiral R humeral shaft fx        62yFemale with R humeral shaft fx    pain control  NWB RUE in coaptation splint  PT/OT  No acute orthopaedic intervention  Please call if you have further question  Can follow up with Dr. Larson this week

## 2022-08-10 ENCOUNTER — APPOINTMENT (OUTPATIENT)
Dept: ORTHOPEDIC SURGERY | Facility: CLINIC | Age: 62
End: 2022-08-10

## 2022-08-10 ENCOUNTER — TRANSCRIPTION ENCOUNTER (OUTPATIENT)
Age: 62
End: 2022-08-10

## 2022-08-10 PROCEDURE — 99214 OFFICE O/P EST MOD 30 MIN: CPT

## 2022-08-10 PROCEDURE — 73060 X-RAY EXAM OF HUMERUS: CPT | Mod: RT

## 2022-08-10 PROCEDURE — 99072 ADDL SUPL MATRL&STAF TM PHE: CPT

## 2022-08-10 RX ORDER — OXYCODONE 5 MG/1
5 TABLET ORAL EVERY 8 HOURS
Qty: 21 | Refills: 0 | Status: ACTIVE | COMMUNITY
Start: 2022-08-10 | End: 1900-01-01

## 2022-08-24 ENCOUNTER — APPOINTMENT (OUTPATIENT)
Dept: ORTHOPEDIC SURGERY | Facility: CLINIC | Age: 62
End: 2022-08-24

## 2022-08-24 PROCEDURE — 99072 ADDL SUPL MATRL&STAF TM PHE: CPT

## 2022-08-24 PROCEDURE — 99213 OFFICE O/P EST LOW 20 MIN: CPT

## 2022-08-24 NOTE — HISTORY OF PRESENT ILLNESS
[de-identified] : Ms. LEO KO is a 62 year RHD woman presents today for humerus fracture sustained 8/4/22. Patient states she injured herself at work. She was walking towards a glass door, felt as if she was going to fall - extended her right arm to brace herself. She fell on her knees, and as she grabbed for the door, she twisted her right arm. She went to [facility] ED and was diagnosed with a midshaft humerus fracture. She presents today in a coaptation splint from the hospital. She had 10/10 pain the other day, and her pain has improved and is now about 6/10. Movement is exacerbating. She takes oxycodone which helps. Tylenol she takes during the day. She denies any paresthesias of her hand.  [FreeTextEntry1] : The injury occurred at work.  She twisted her arm and sustained a right humeral shaft fracture [FreeTextEntry2] : Huang [FreeTextEntry3] : Patient unable to use right arm [FreeTextEntry4] : Patient was in a coaptation splint [Has the patient missed work because of the injury/illness?] : The patient has missed work because of the injury/illness. [No] : The patient is currently not working. [FreeTextEntry6] : 8/4/2022

## 2022-08-24 NOTE — HISTORY OF PRESENT ILLNESS
[de-identified] : Ms. ELO KO is a 62 year RHD woman presents today for humerus fracture sustained 8/4/22. Patient states she injured herself at work. She was walking towards a glass door, felt as if she was going to fall - extended her right arm to brace herself. She fell on her knees, and as she grabbed for the door, she twisted her right arm. She went to [facility] ED and was diagnosed with a midshaft humerus fracture. She takes oxycodone which helps. Tylenol she takes during the day. She denies any paresthesias of her hand. \par \par Since her last visit she states she is still having pain and has been wearing the Wilhelm brace with use of sling.  He denies any numbness or tingling of her fingers.  She is considering going through operative treatment. [FreeTextEntry1] : The injury occurred at work.  She fell and twisted her arm causing a right humeral shaft fracture [FreeTextEntry2] : Huang [FreeTextEntry3] : She is unable to use her right arm [FreeTextEntry4] : [place] [Has the patient missed work because of the injury/illness?] : The patient has missed work because of the injury/illness. [No] : The patient is currently not working. [FreeTextEntry6] : 8/4/2022

## 2022-08-24 NOTE — DISCUSSION/SUMMARY
[de-identified] : Patient presents with . Their symptoms are consistent with . The nature of this condition was described at length with the patient and they verbalized understanding. \par -The risks and benefits of operative versus nonoperative management were discussed at length with the patient. The patient shows a good understanding of the injury and treatment options. They would like to move forward with nonoperative management. \par -Wilhelm Fracture Brace of the right humerus\par -Analgesia as needed\par -Nonweightbearing of the right upper extremity\par -Sling \par -Followup in 2 weeks for repeat xrays and re-evaluation of fracture and axillary skin lesion\par -All of the patient's questions and concerns were addressed during this visit.  The patient was in full agreement with this plan and appreciative of their care.\par

## 2022-08-24 NOTE — DISCUSSION/SUMMARY
[de-identified] : 62-year-old woman with a right humerus fracture, approximately 3 weeks out being treated in a Wilhelm\par \par -The risks and benefits of operative versus nonoperative management were discussed at length with the patient. The patient shows a good understanding of the injury and treatment options. They would still like to move forward with nonoperative treatment for now.\par -Continue Wilhelm brace\par -The patient is going to talk with her cardiologist to discuss potential surgery.\par -We provided the patient with the surgical schedulers contact information.  If the patient is interested in having surgery we will move forward with booking for next week on Monday. \par -All of the patient's questions and concerns were addressed.\par

## 2022-08-24 NOTE — PHYSICAL EXAM
[de-identified] : Ms. LEO KO is sitting comfortably in the exam room \par Right arm\par -Skin is intact, mild swelling, mild ecchymosis\par -Patient is in Wilhelm brace and sling, she has difficulty moving her shoulder due to pain\par -Able to make a fist, extend wrist, extend fingers\par -Sensation is intact median, radial, ulnar, axillary nerves\par -Motor is intact median, radial, ulnar, axillary nerves distally\par -Hand is warm and well-perfused, Palpable radial and ulnar pulses\par  [de-identified] : X-rays of the right humerus were taken in the office today including AP and lateral.  X-rays show a midshaft long spiral fracture.  There is some cortical abutment at the proximal aspect of the fracture.  No significant shortening.  The distal aspect of the fracture is displaced

## 2022-08-24 NOTE — HISTORY OF PRESENT ILLNESS
[de-identified] : Ms. LEO KO is a 62 year RHD woman presents today for humerus fracture sustained 8/4/22. Patient states she injured herself at work. She was walking towards a glass door, felt as if she was going to fall - extended her right arm to brace herself. She fell on her knees, and as she grabbed for the door, she twisted her right arm. She went to [facility] ED and was diagnosed with a midshaft humerus fracture. She presents today in a coaptation splint from the hospital. She had 10/10 pain the other day, and her pain has improved and is now about 6/10. Movement is exacerbating. She takes oxycodone which helps. Tylenol she takes during the day. She denies any paresthesias of her hand.  [FreeTextEntry1] : The injury occurred at work.  She twisted her arm and sustained a right humeral shaft fracture [FreeTextEntry2] : Huang [FreeTextEntry3] : Patient unable to use right arm [FreeTextEntry4] : Patient was in a coaptation splint [Has the patient missed work because of the injury/illness?] : The patient has missed work because of the injury/illness. [No] : The patient is currently not working. [FreeTextEntry6] : 8/4/2022

## 2022-08-24 NOTE — PHYSICAL EXAM
[de-identified] : Right Shoulder/Humerus Exam\par \par Inspection: Positive deformity with angulation of arm laterally about 5-10 degrees. \par Skin: No masses, diffuse ecchymosis of the humerus in the medial and posterior aspects. Skin blister that is about 2cm in size noted along the chest wall aspect of the axilla due to irritation from the hospital coaptation splint. No signs of infection. Blister was deroofed by the splint prior to today's visit with no signs of drainage or infection. This wound was covered with xeroform and a gauze bandage. Skin over the fracture site is intact with no breakdown or erythema. No skin tenting.\par Neck: Negative Spurlings, full ROM without pain\par ROM: Not examined due to injury\par Tenderness:positive tenderness of midshaft humerus.\par Coaptation splint removed carefully and the humerus was kept in alignment/held stable, and a Wilhelm Fracture Brace was placed carefully on the patient.\par Strength: 5/5 strength distal to the elbow\par AC Joint: No TTP, no pain with cross arm testing.\par Vascular: 2+ radial pulse\par Neuro: AIN/PIN/Ulnar nn. intact to motor\par Sensation: Grossly intact without deficits\par \par \par \par Axillary blister - covered with xeroform and gauze. [de-identified] : 3 xray views of the right humerus were obtained and compared to prior xrays at another facility. There is a midshaft spiral humerus fracture with lateral angulation and displacement of the proximal portion laterally/distal portion medially. This displacement is more pronounced from the outside facility xrays. 3 repeat xray views were obtained of the right humerus after placement of the Wilhelm Fracture Brace with no further displacement of the fracture from prior office xrays.

## 2022-08-24 NOTE — DISCUSSION/SUMMARY
[de-identified] : Patient presents with . Their symptoms are consistent with . The nature of this condition was described at length with the patient and they verbalized understanding. \par -The risks and benefits of operative versus nonoperative management were discussed at length with the patient. The patient shows a good understanding of the injury and treatment options. They would like to move forward with nonoperative management. \par -Wilhelm Fracture Brace of the right humerus\par -Analgesia as needed\par -Nonweightbearing of the right upper extremity\par -Sling \par -Followup in 2 weeks for repeat xrays and re-evaluation of fracture and axillary skin lesion\par -All of the patient's questions and concerns were addressed during this visit.  The patient was in full agreement with this plan and appreciative of their care.\par

## 2022-08-24 NOTE — PHYSICAL EXAM
[de-identified] : Right Shoulder/Humerus Exam\par \par Inspection: Positive deformity with angulation of arm laterally about 5-10 degrees. \par Skin: No masses, diffuse ecchymosis of the humerus in the medial and posterior aspects. Skin blister that is about 2cm in size noted along the chest wall aspect of the axilla due to irritation from the hospital coaptation splint. No signs of infection. Blister was deroofed by the splint prior to today's visit with no signs of drainage or infection. This wound was covered with xeroform and a gauze bandage. Skin over the fracture site is intact with no breakdown or erythema. No skin tenting.\par Neck: Negative Spurlings, full ROM without pain\par ROM: Not examined due to injury\par Tenderness:positive tenderness of midshaft humerus.\par Coaptation splint removed carefully and the humerus was kept in alignment/held stable, and a Wilhelm Fracture Brace was placed carefully on the patient.\par Strength: 5/5 strength distal to the elbow\par AC Joint: No TTP, no pain with cross arm testing.\par Vascular: 2+ radial pulse\par Neuro: AIN/PIN/Ulnar nn. intact to motor\par Sensation: Grossly intact without deficits\par \par \par \par Axillary blister - covered with xeroform and gauze. [de-identified] : 3 xray views of the right humerus were obtained and compared to prior xrays at another facility. There is a midshaft spiral humerus fracture with lateral angulation and displacement of the proximal portion laterally/distal portion medially. This displacement is more pronounced from the outside facility xrays. 3 repeat xray views were obtained of the right humerus after placement of the Wilhelm Fracture Brace with no further displacement of the fracture from prior office xrays.

## 2022-08-26 ENCOUNTER — OUTPATIENT (OUTPATIENT)
Dept: OUTPATIENT SERVICES | Facility: HOSPITAL | Age: 62
LOS: 1 days | End: 2022-08-26
Payer: MEDICAID

## 2022-08-26 ENCOUNTER — OUTPATIENT (OUTPATIENT)
Dept: OUTPATIENT SERVICES | Facility: HOSPITAL | Age: 62
LOS: 1 days | End: 2022-08-26
Payer: COMMERCIAL

## 2022-08-26 VITALS
RESPIRATION RATE: 14 BRPM | HEIGHT: 62 IN | TEMPERATURE: 99 F | DIASTOLIC BLOOD PRESSURE: 72 MMHG | SYSTOLIC BLOOD PRESSURE: 111 MMHG | HEART RATE: 55 BPM | WEIGHT: 173.06 LBS | OXYGEN SATURATION: 97 %

## 2022-08-26 DIAGNOSIS — Z98.891 HISTORY OF UTERINE SCAR FROM PREVIOUS SURGERY: Chronic | ICD-10-CM

## 2022-08-26 DIAGNOSIS — E11.9 TYPE 2 DIABETES MELLITUS WITHOUT COMPLICATIONS: ICD-10-CM

## 2022-08-26 DIAGNOSIS — Z98.890 OTHER SPECIFIED POSTPROCEDURAL STATES: Chronic | ICD-10-CM

## 2022-08-26 DIAGNOSIS — S42.309A UNSPECIFIED FRACTURE OF SHAFT OF HUMERUS, UNSPECIFIED ARM, INITIAL ENCOUNTER FOR CLOSED FRACTURE: ICD-10-CM

## 2022-08-26 DIAGNOSIS — Z01.818 ENCOUNTER FOR OTHER PREPROCEDURAL EXAMINATION: ICD-10-CM

## 2022-08-26 DIAGNOSIS — G47.33 OBSTRUCTIVE SLEEP APNEA (ADULT) (PEDIATRIC): ICD-10-CM

## 2022-08-26 DIAGNOSIS — Z11.52 ENCOUNTER FOR SCREENING FOR COVID-19: ICD-10-CM

## 2022-08-26 DIAGNOSIS — S42.341D DISPLACED SPIRAL FRACTURE OF SHAFT OF HUMERUS, RIGHT ARM, SUBSEQUENT ENCOUNTER FOR FRACTURE WITH ROUTINE HEALING: ICD-10-CM

## 2022-08-26 LAB
ANION GAP SERPL CALC-SCNC: 12 MMOL/L — SIGNIFICANT CHANGE UP (ref 5–17)
BUN SERPL-MCNC: 19 MG/DL — SIGNIFICANT CHANGE UP (ref 7–23)
CALCIUM SERPL-MCNC: 9.9 MG/DL — SIGNIFICANT CHANGE UP (ref 8.4–10.5)
CHLORIDE SERPL-SCNC: 99 MMOL/L — SIGNIFICANT CHANGE UP (ref 96–108)
CO2 SERPL-SCNC: 24 MMOL/L — SIGNIFICANT CHANGE UP (ref 22–31)
CREAT SERPL-MCNC: 0.65 MG/DL — SIGNIFICANT CHANGE UP (ref 0.5–1.3)
EGFR: 99 ML/MIN/1.73M2 — SIGNIFICANT CHANGE UP
GLUCOSE SERPL-MCNC: 144 MG/DL — HIGH (ref 70–99)
HCT VFR BLD CALC: 39 % — SIGNIFICANT CHANGE UP (ref 34.5–45)
HGB BLD-MCNC: 12.2 G/DL — SIGNIFICANT CHANGE UP (ref 11.5–15.5)
MCHC RBC-ENTMCNC: 27.8 PG — SIGNIFICANT CHANGE UP (ref 27–34)
MCHC RBC-ENTMCNC: 31.3 GM/DL — LOW (ref 32–36)
MCV RBC AUTO: 88.8 FL — SIGNIFICANT CHANGE UP (ref 80–100)
NRBC # BLD: 0 /100 WBCS — SIGNIFICANT CHANGE UP (ref 0–0)
PLATELET # BLD AUTO: 314 K/UL — SIGNIFICANT CHANGE UP (ref 150–400)
POTASSIUM SERPL-MCNC: 3.8 MMOL/L — SIGNIFICANT CHANGE UP (ref 3.5–5.3)
POTASSIUM SERPL-SCNC: 3.8 MMOL/L — SIGNIFICANT CHANGE UP (ref 3.5–5.3)
RBC # BLD: 4.39 M/UL — SIGNIFICANT CHANGE UP (ref 3.8–5.2)
RBC # FLD: 12.8 % — SIGNIFICANT CHANGE UP (ref 10.3–14.5)
SARS-COV-2 RNA SPEC QL NAA+PROBE: SIGNIFICANT CHANGE UP
SODIUM SERPL-SCNC: 135 MMOL/L — SIGNIFICANT CHANGE UP (ref 135–145)
WBC # BLD: 4.41 K/UL — SIGNIFICANT CHANGE UP (ref 3.8–10.5)
WBC # FLD AUTO: 4.41 K/UL — SIGNIFICANT CHANGE UP (ref 3.8–10.5)

## 2022-08-26 PROCEDURE — U0005: CPT

## 2022-08-26 PROCEDURE — U0003: CPT

## 2022-08-26 PROCEDURE — C9803: CPT

## 2022-08-26 PROCEDURE — 36415 COLL VENOUS BLD VENIPUNCTURE: CPT

## 2022-08-26 PROCEDURE — 83036 HEMOGLOBIN GLYCOSYLATED A1C: CPT

## 2022-08-26 PROCEDURE — 80048 BASIC METABOLIC PNL TOTAL CA: CPT

## 2022-08-26 PROCEDURE — G0463: CPT

## 2022-08-26 PROCEDURE — 85027 COMPLETE CBC AUTOMATED: CPT

## 2022-08-26 RX ORDER — METHOTREXATE 2.5 MG/1
0 TABLET ORAL
Qty: 0 | Refills: 0 | DISCHARGE

## 2022-08-26 RX ORDER — ETANERCEPT 25 MG
50 VIAL (EA) SUBCUTANEOUS
Qty: 0 | Refills: 0 | DISCHARGE

## 2022-08-26 RX ORDER — RABEPRAZOLE 20 MG/1
0 TABLET, DELAYED RELEASE ORAL
Qty: 0 | Refills: 0 | DISCHARGE

## 2022-08-26 RX ORDER — ESCITALOPRAM OXALATE 10 MG/1
0 TABLET, FILM COATED ORAL
Qty: 0 | Refills: 0 | DISCHARGE

## 2022-08-26 NOTE — H&P PST ADULT - NSICDXPASTMEDICALHX_GEN_ALL_CORE_FT
PAST MEDICAL HISTORY:  Rheumatoid arthritis      PAST MEDICAL HISTORY:  History of heart failure diagnosed with 3/2022    Rheumatoid arthritis     Type 2 diabetes mellitus      PAST MEDICAL HISTORY:  GERD (gastroesophageal reflux disease)     H/O eczema     History of heart failure diagnosed with 3/2022    PAPO (obstructive sleep apnea)     Rheumatoid arthritis     Type 2 diabetes mellitus

## 2022-08-26 NOTE — H&P PST ADULT - WILL THE PATIENT ACCEPT THE PFIZER COVID-19 VACCINE IF ELIGIBLE AND IT IS AVAILABLE?
"Subjective:   Leeann Powell is a 31 y.o. male who presents for Testicle Pain (Per patient has been in pain for a month. Per patient thinks he has some swelling. )       HPI  Patient presents for evaluation of intermittent history of right testicle pain over the past month.  Patient was seen in urgent care approximate month ago for right groin pain, was negative for hernia at that time.  Patient states he has continued feeling a \"cord\" involving his right testicle, intermittent painful ejaculation, and possible right testicular swelling since his symptoms began.    ROS  All other systems are negative except as documented above within HPI.    MEDS:   Current Outpatient Medications:   •  acetaminophen (TYLENOL) 500 MG Tab, Take 500-1,000 mg by mouth as needed in the morning and 500-1,000 mg as needed at noon and 500-1,000 mg as needed in the evening and 500-1,000 mg as needed before bedtime., Disp: , Rfl:   ALLERGIES:   Allergies   Allergen Reactions   • Nkda [No Known Drug Allergy]        Patient's PMH, SocHx, SurgHx, FamHx, Drug allergies and medications were reviewed.     Objective:   /70   Pulse 99   Temp 36.2 °C (97.1 °F) (Temporal)   Resp 16   Ht 1.778 m (5' 10\")   Wt 99.8 kg (220 lb)   SpO2 97%   BMI 31.57 kg/m²     Physical Exam  Vitals and nursing note reviewed.   Constitutional:       General: He is awake.      Appearance: Normal appearance. He is well-developed.   HENT:      Head: Normocephalic and atraumatic.      Right Ear: External ear normal.      Left Ear: External ear normal.      Nose: Nose normal.      Mouth/Throat:      Lips: Pink.      Mouth: Mucous membranes are moist.      Pharynx: Oropharynx is clear.   Eyes:      General: Lids are normal.      Extraocular Movements: Extraocular movements intact.      Conjunctiva/sclera: Conjunctivae normal.   Cardiovascular:      Rate and Rhythm: Normal rate and regular rhythm.   Pulmonary:      Effort: Pulmonary effort is normal. "   Genitourinary:     Testes:         Right: Swelling (minimal) present.      Epididymis:      Right: Inflamed.   Musculoskeletal:         General: Normal range of motion.      Cervical back: Normal range of motion.   Skin:     General: Skin is warm and dry.   Neurological:      Mental Status: He is alert and oriented to person, place, and time.   Psychiatric:         Mood and Affect: Mood normal.         Behavior: Behavior normal. Behavior is cooperative.         Thought Content: Thought content normal.         Judgment: Judgment normal.       Narrative & Impression     6/13/2022 5:24 PM     HISTORY/REASON FOR EXAM: Pain.     TECHNIQUE/EXAM DESCRIPTION:  Real-time sonography of the scrotum was performed with gray-scale, color and duplex Doppler imaging.     COMPARISON: None     FINDINGS:     The right testis measures 20 x 12 x 16 mm.  Normal vascularity on color Doppler. The left testis measures 20 x 10 x 14 mm. Normal vascularity on color Doppler.     There is bilateral upper pole posterior increased echogenicity with a fairly linear character, consistent with rete testes, mediastinum testis     Vascular flow is symmetric.     Appearance of the epididymides are within normal limits.     No abnormal volume hydrocele is detected.     No varicocele is detected.     IMPRESSION:     No sonographic evidence of intratesticular mass or torsion             Exam Ended: 06/13/22  5:52 PM Last Resulted: 06/13/22  6:05 PM               Assessment/Plan:   Assessment    1. Pain in right testicle  - POCT Urinalysis  - [id number]; Future  - Referral to Urology      Vital signs stable at today's acute urgent care visit. Reviewed any test results that were completed in the clinic with patient.  Ordered US, called patient with results post visit.  Referral to urology placed.    Advised the patient to follow-up with the primary care provider for recheck, reevaluation, and/or consideration of further management. Return to  urgent care with any worsening/continued symptoms.  Red flags discussed and indications to immediately call 911 or present to the ED.  All questions were encouraged and answered to the patient's satisfaction and understanding, and they agree to the plan of care.     I personally reviewed prior external notes and test results pertinent to today and independently reviewed and interpreted all diagnostics ordered during this urgent care acute visit. Time spent evaluating this patient includes preparing for visit, counseling/education, exam, evaluation, obtaining history, and ordering lab/test/procedures.      Please note that this dictation was created using voice recognition software. I have made a reasonable attempt to correct obvious errors, but I expect that there are errors of grammar and possibly content that I did not discover before finalizing the note.     No

## 2022-08-26 NOTE — H&P PST ADULT - NSANTHOSAYNRD_GEN_A_CORE
No. PAPO screening performed.  STOP BANG Legend: 0-2 = LOW Risk; 3-4 = INTERMEDIATE Risk; 5-8 = HIGH Risk Yes

## 2022-08-26 NOTE — H&P PST ADULT - ACTIVITY
prior to fall 8/4/2022, pt able to walk up 2 flights of stairs prior to fall 8/4/2022, pt able to walk up 1.5 flights of stairs

## 2022-08-26 NOTE — H&P PST ADULT - NSANTHAGERD_ENT_A_CORE
Pt stated [facility] does not have enough medication in stock to fill prescription fully. Pt would like medication sent to [place] pharmacy on [address]. Prescription cancelled at [facility]   
Yes

## 2022-08-26 NOTE — H&P PST ADULT - NSICDXPASTSURGICALHX_GEN_ALL_CORE_FT
PAST SURGICAL HISTORY:  H/O arthroscopy of left knee     S/P  section     S/P excision of lipoma back  left forearm    S/P lumpectomy, left breast benign    Status post de Quervain's release surgery VANIA

## 2022-08-26 NOTE — H&P PST ADULT - MUSCULOSKELETAL COMMENTS
brace and sling on right arm, able to move all fingers, denies paresthesia right hand pink, warm to touch, no edema noted, +radial pulse, RUE in brace and sling

## 2022-08-26 NOTE — H&P PST ADULT - ATTENDING COMMENTS
Associated images, notes, and labs were reviewed. The patient was seen and examined. Risks and benefits of operative versus nonoperative management were discussed with the patient. The patient showed a good understanding of the injury and the treatment options. They would like to move forward with operative management of the humerus fracture.

## 2022-08-26 NOTE — H&P PST ADULT - HISTORY OF PRESENT ILLNESS
63 yo female. PMH rheumatoid arthris (on enbrel), s/p mechanical fall on 8/4/2022 while at working as a , x ray revealed right humerus midshaft spiral fracture, evaluated by Dr. Larson, now pt presents to PST scheduled for   covid test scheduled on  63 yo female. PMH rheumatoid arthris (on enbrel), HTN, HLD, T2DM (finger stick = 100s), PAPO (does not tolerate mouth piece), CHF (on entresto, last echo 5/2022 EF=30%), s/p mechanical fall on 8/4/2022 while at working as a , x ray revealed right humerus midshaft spiral fracture, evaluated by Dr. Larson, now pt presents to PST scheduled for ORIF of right humerus on 8/29.  covid test done on 8/26 at [facility].  61 yo female. PMH rheumatoid arthritis (on enbrel), HTN, HLD, T2DM (pt does not check her finger stick), PAPO (does not tolerate mouth piece), CHF (on entresto, last echo 5/2022 EF=30%), s/p mechanical fall on 8/4/2022 while at working as a  (pt held on to door to break fall and twisted RUE, x ray revealed right humerus midshaft spiral fracture, evaluated by Dr. Larson, now pt presents to PST scheduled for ORIF of right humerus on 8/29.  covid test done on 8/26 at [facility].   **pt stated her cardiologist, Dr. Morfin gave her verbal clearance to have ORIF surgery.  will need call on Monday morning for clearance from Dr. Morfin.**

## 2022-08-27 LAB
A1C WITH ESTIMATED AVERAGE GLUCOSE RESULT: 6.3 % — HIGH (ref 4–5.6)
ESTIMATED AVERAGE GLUCOSE: 134 MG/DL — HIGH (ref 68–114)

## 2022-08-29 ENCOUNTER — APPOINTMENT (OUTPATIENT)
Dept: ORTHOPEDIC SURGERY | Facility: HOSPITAL | Age: 62
End: 2022-08-29

## 2022-08-29 ENCOUNTER — OUTPATIENT (OUTPATIENT)
Dept: OUTPATIENT SERVICES | Facility: HOSPITAL | Age: 62
LOS: 1 days | End: 2022-08-29
Payer: COMMERCIAL

## 2022-08-29 ENCOUNTER — TRANSCRIPTION ENCOUNTER (OUTPATIENT)
Age: 62
End: 2022-08-29

## 2022-08-29 DIAGNOSIS — Z98.890 OTHER SPECIFIED POSTPROCEDURAL STATES: Chronic | ICD-10-CM

## 2022-08-29 DIAGNOSIS — Z98.891 HISTORY OF UTERINE SCAR FROM PREVIOUS SURGERY: Chronic | ICD-10-CM

## 2022-08-29 PROCEDURE — ZZZZZ: CPT

## 2022-08-30 ENCOUNTER — OUTPATIENT (OUTPATIENT)
Dept: OUTPATIENT SERVICES | Facility: HOSPITAL | Age: 62
LOS: 1 days | End: 2022-08-30

## 2022-08-30 ENCOUNTER — TRANSCRIPTION ENCOUNTER (OUTPATIENT)
Age: 62
End: 2022-08-30

## 2022-08-30 DIAGNOSIS — S42.341D DISPLACED SPIRAL FRACTURE OF SHAFT OF HUMERUS, RIGHT ARM, SUBSEQUENT ENCOUNTER FOR FRACTURE WITH ROUTINE HEALING: ICD-10-CM

## 2022-08-30 DIAGNOSIS — Z98.890 OTHER SPECIFIED POSTPROCEDURAL STATES: Chronic | ICD-10-CM

## 2022-08-30 DIAGNOSIS — Z98.891 HISTORY OF UTERINE SCAR FROM PREVIOUS SURGERY: Chronic | ICD-10-CM

## 2022-08-30 PROBLEM — Z86.79 PERSONAL HISTORY OF OTHER DISEASES OF THE CIRCULATORY SYSTEM: Chronic | Status: ACTIVE | Noted: 2022-08-26

## 2022-08-30 PROBLEM — K21.9 GASTRO-ESOPHAGEAL REFLUX DISEASE WITHOUT ESOPHAGITIS: Chronic | Status: ACTIVE | Noted: 2022-08-26

## 2022-08-30 PROBLEM — E11.9 TYPE 2 DIABETES MELLITUS WITHOUT COMPLICATIONS: Chronic | Status: ACTIVE | Noted: 2022-08-26

## 2022-08-30 PROBLEM — Z87.2 PERSONAL HISTORY OF DISEASES OF THE SKIN AND SUBCUTANEOUS TISSUE: Chronic | Status: ACTIVE | Noted: 2022-08-26

## 2022-08-30 PROCEDURE — 82962 GLUCOSE BLOOD TEST: CPT

## 2022-08-30 PROCEDURE — C1889: CPT

## 2022-08-30 PROCEDURE — 24515 OPTX HUMRL SHFT FX PLATE/SCR: CPT | Mod: LT

## 2022-08-30 PROCEDURE — C1713: CPT

## 2022-08-30 PROCEDURE — 76000 FLUOROSCOPY <1 HR PHYS/QHP: CPT

## 2022-08-31 DIAGNOSIS — S42.341D DISPLACED SPIRAL FRACTURE OF SHAFT OF HUMERUS, RIGHT ARM, SUBSEQUENT ENCOUNTER FOR FRACTURE WITH ROUTINE HEALING: ICD-10-CM

## 2022-09-13 DIAGNOSIS — S42.301A UNSPECIFIED FRACTURE OF SHAFT OF HUMERUS, RIGHT ARM, INITIAL ENCOUNTER FOR CLOSED FRACTURE: ICD-10-CM

## 2022-09-14 ENCOUNTER — APPOINTMENT (OUTPATIENT)
Dept: ORTHOPEDIC SURGERY | Facility: CLINIC | Age: 62
End: 2022-09-14

## 2022-09-14 VITALS
BODY MASS INDEX: 31.28 KG/M2 | SYSTOLIC BLOOD PRESSURE: 113 MMHG | HEIGHT: 62 IN | WEIGHT: 170 LBS | HEART RATE: 65 BPM | DIASTOLIC BLOOD PRESSURE: 68 MMHG

## 2022-09-14 PROCEDURE — 99024 POSTOP FOLLOW-UP VISIT: CPT

## 2022-09-14 PROCEDURE — 73060 X-RAY EXAM OF HUMERUS: CPT

## 2022-10-19 ENCOUNTER — APPOINTMENT (OUTPATIENT)
Dept: ORTHOPEDIC SURGERY | Facility: CLINIC | Age: 62
End: 2022-10-19

## 2022-10-19 VITALS
HEART RATE: 64 BPM | SYSTOLIC BLOOD PRESSURE: 109 MMHG | BODY MASS INDEX: 31.28 KG/M2 | HEIGHT: 62 IN | WEIGHT: 170 LBS | DIASTOLIC BLOOD PRESSURE: 66 MMHG

## 2022-10-19 PROCEDURE — 99024 POSTOP FOLLOW-UP VISIT: CPT

## 2022-10-19 PROCEDURE — 73060 X-RAY EXAM OF HUMERUS: CPT

## 2022-10-19 NOTE — HISTORY OF PRESENT ILLNESS
[de-identified] : s/p ORIF right humeral shaft, 8/30/22 [de-identified] : LEO Santo is a 62 year RHD woman presents today for her second post op s/p Right Humerus ORIF 8/29/22. Since her last visit she states she is feeling great and PT has been helping her. She denies any fever or chills.  [de-identified] : Ms. LEO KO is sitting comfortably in the exam room \par RIGHT arm\par -Incision is well-healed, no erythema, no signs of infection\par -FE: 160, ER: 20, IR: L5, ABD: 90\par -Able to make a fist\par -Sensation is intact median, radial, ulnar, axillary nerves\par -Motor is intact median, radial, ulnar, axillary nerves\par -Hand is warm and well-perfused, Palpable radial and ulnar pulses\par  [de-identified] : Xrays of the right humerus were taken in the office today, 10/19/22.  AP and lateral.  X-rays show excellent overall alignment of the humerus with excellent positioning of the plates and screws.  No lucencies around the screws.  No displacement of the implants or fracture. [de-identified] : 62-year-old woman s/p ORIF right humeral shaft, approximately 7 weeks out, doing well with some mild stiffness of the shoulder [de-identified] : -WBAT\par -Continue Physical therapy\par -Home exercises for range of motion of the shoulder.  These were demonstrated in the office today\par -Follow-up in 3 months with x-rays at that time\par -All the patient's questions and concerns were addressed during this visit\par

## 2022-11-01 NOTE — REASON FOR VISIT
[Post Operative Visit] : a post operative visit for [FreeTextEntry2] : Right Humerus Fracture ORIF 8/29/22

## 2022-11-01 NOTE — DISCUSSION/SUMMARY
[de-identified] : 62-year-old woman approximately 2 weeks out from ORIF humerus doing great\par \par -Long discussion was held with the patient regarding the importance of moving her shoulder.  Her shoulder is very stiff because she did not move her shoulder at all while she was attempting nonoperative management\par -Discontinue sling\par -Home exercises, these were demonstrated in the office today\par -Physical therapy for range of motion\par -All of the patient's questions and concerns were addressed.\par

## 2022-11-01 NOTE — HISTORY OF PRESENT ILLNESS
[Has the patient missed work because of the injury/illness?] : The patient has missed work because of the injury/illness. [No] : The patient is currently not working. [de-identified] : Ms. LEO KO is a 62 year RHD woman presents today s/p Right Humerus ORIF 8/29/22. She is overall doing well following surgery. She denies any fever or chills. Aquacel dressing in place.  She is able to extend her wrist and fingers.  She denies any numbness or tingling in her hand or wrist.\par \par Hx: Right Humerus fracture sustained 8/4/22. Patient stated she injured herself at work. She was walking towards a glass door, felt as if she was going to fall - extended her right arm to brace herself. She fell on her knees, and as she grabbed for the door, she twisted her right arm. She originally went to [facility] ED and was diagnosed with a midshaft humerus fracture.  She initially tried nonoperative management and was unable to tolerate the Wilhelm brace.\par   [FreeTextEntry1] : The injury occurred at work.  She fell and twisted her arm causing a right humeral shaft fracture [FreeTextEntry2] : Huang [FreeTextEntry3] : She is unable to use her right arm [FreeTextEntry4] : [place] [FreeTextEntry5] : Sling [FreeTextEntry6] : 8/4/2022

## 2022-11-01 NOTE — PHYSICAL EXAM
[de-identified] : Ms. LEO KO is sitting comfortably in the exam room \par Right arm\par -Mild swelling, mild ecchymosis\par -Incision is clean and dry, no erythema, no no signs of infection\par -She is sore around the shoulder and resists moving her shoulder\par -Able to make a fist, extend wrist, extend fingers\par -Sensation is intact median, radial, ulnar, axillary nerves\par -Motor is intact median, radial, ulnar, axillary nerves distally\par -Hand is warm and well-perfused, Palpable radial and ulnar pulses\par  [de-identified] : Xrays of the right humerus were taken in the office today, 9/14/22.  AP and lateral.  X-rays show good overall alignment of the humerus with good positioning of the implants.  No displacement of the fracture or implants.

## 2022-11-17 ENCOUNTER — APPOINTMENT (OUTPATIENT)
Dept: ORTHOPEDIC SURGERY | Facility: CLINIC | Age: 62
End: 2022-11-17
Payer: OTHER MISCELLANEOUS

## 2022-11-17 PROCEDURE — 99024 POSTOP FOLLOW-UP VISIT: CPT

## 2022-11-17 RX ORDER — BISOPROLOL FUMARATE 5 MG/1
5 TABLET, FILM COATED ORAL
Qty: 180 | Refills: 0 | Status: ACTIVE | COMMUNITY
Start: 2022-09-07

## 2022-11-17 RX ORDER — SACUBITRIL AND VALSARTAN 24; 26 MG/1; MG/1
24-26 TABLET, FILM COATED ORAL
Qty: 60 | Refills: 0 | Status: ACTIVE | COMMUNITY
Start: 2022-06-09

## 2022-11-17 RX ORDER — ERTUGLIFLOZIN 15 MG/1
15 TABLET, FILM COATED ORAL
Qty: 90 | Refills: 0 | Status: ACTIVE | COMMUNITY
Start: 2022-06-13

## 2022-11-17 RX ORDER — SPIRONOLACTONE 25 MG/1
25 TABLET ORAL
Qty: 30 | Refills: 0 | Status: ACTIVE | COMMUNITY
Start: 2022-09-01

## 2022-11-17 RX ORDER — ASPIRIN 81 MG/1
81 TABLET, CHEWABLE ORAL
Qty: 30 | Refills: 0 | Status: ACTIVE | COMMUNITY
Start: 2022-05-16

## 2022-11-17 RX ORDER — FLUCONAZOLE 150 MG/1
150 TABLET ORAL
Qty: 1 | Refills: 0 | Status: ACTIVE | COMMUNITY
Start: 2022-09-20

## 2022-11-17 RX ORDER — TRIAMCINOLONE ACETONIDE 1 MG/G
0.1 CREAM TOPICAL
Qty: 454 | Refills: 0 | Status: ACTIVE | COMMUNITY
Start: 2022-11-04

## 2022-11-17 RX ORDER — FUROSEMIDE 40 MG/1
40 TABLET ORAL
Qty: 30 | Refills: 0 | Status: ACTIVE | COMMUNITY
Start: 2022-06-09

## 2022-11-17 RX ORDER — HYDROCORTISONE 25 MG/G
2.5 CREAM TOPICAL
Qty: 28 | Refills: 0 | Status: ACTIVE | COMMUNITY
Start: 2022-11-04

## 2022-11-17 RX ORDER — ATORVASTATIN CALCIUM 40 MG/1
40 TABLET, FILM COATED ORAL
Qty: 30 | Refills: 0 | Status: ACTIVE | COMMUNITY
Start: 2022-06-09

## 2022-11-17 RX ORDER — METFORMIN HYDROCHLORIDE 500 MG/1
500 TABLET, COATED ORAL
Qty: 30 | Refills: 0 | Status: ACTIVE | COMMUNITY
Start: 2022-09-01

## 2022-11-17 NOTE — HISTORY OF PRESENT ILLNESS
[Chills] : no chills [Constipation] : no constipation [Diarrhea] : no diarrhea [Dysuria] : no dysuria [Fever] : no fever [Nausea] : no nausea [Vomiting] : no vomiting [de-identified] : s/p ORIF right humeral shaft, 8/30/22 [de-identified] : LEO Santo is a 62 year RHD woman presents today for her second post op s/p Right Humerus ORIF 8/29/22. Since her last visit she states she is feeling well. She feels PT has been very helpful in regards to ROM. She has minimal pain. \par She denies any numbness or tingling in the fingers.   [de-identified] : Ms. LEO KO is sitting comfortably in the exam room \par RIGHT arm\par -Incision is well-healed, no erythema, no signs of infection\par -FE: 160, ER: 20, IR: L5, ABD: 90\par -Able to make a fist\par -Sensation is intact median, radial, ulnar, axillary nerves\par -Motor is intact median, radial, ulnar, axillary nerves\par -Hand is warm and well-perfused, Palpable radial and ulnar pulses\par  [de-identified] : Xrays of the right humerus were taken in the office today, 11/17/22.  AP and lateral.  X-rays show excellent overall alignment of the humerus with excellent positioning of the plates and screws.  No lucencies around the screws.  No displacement of the implants or fracture. [de-identified] : 62-year-old woman s/p ORIF right humeral shaft, approximately 11 weeks out, doing well with some mild stiffness of the shoulder [de-identified] : -WBAT\par -Continue Physical therapy to improve ROM and strengthen\par -Follow-up in 3 months with x-rays at that time\par -All the patient's questions and concerns were addressed during this visit\par

## 2022-12-16 ENCOUNTER — APPOINTMENT (OUTPATIENT)
Dept: OBGYN | Facility: CLINIC | Age: 62
End: 2022-12-16

## 2022-12-16 PROCEDURE — 82270 OCCULT BLOOD FECES: CPT

## 2022-12-16 PROCEDURE — 81002 URINALYSIS NONAUTO W/O SCOPE: CPT

## 2022-12-16 PROCEDURE — 99396 PREV VISIT EST AGE 40-64: CPT

## 2023-02-23 ENCOUNTER — APPOINTMENT (OUTPATIENT)
Dept: ORTHOPEDIC SURGERY | Facility: CLINIC | Age: 63
End: 2023-02-23
Payer: OTHER MISCELLANEOUS

## 2023-02-23 VITALS
SYSTOLIC BLOOD PRESSURE: 123 MMHG | BODY MASS INDEX: 31.28 KG/M2 | WEIGHT: 170 LBS | HEIGHT: 62 IN | HEART RATE: 62 BPM | DIASTOLIC BLOOD PRESSURE: 57 MMHG

## 2023-02-23 PROCEDURE — 99213 OFFICE O/P EST LOW 20 MIN: CPT

## 2023-02-23 PROCEDURE — 73060 X-RAY EXAM OF HUMERUS: CPT

## 2023-02-27 ENCOUNTER — APPOINTMENT (OUTPATIENT)
Dept: ORTHOPEDIC SURGERY | Facility: CLINIC | Age: 63
End: 2023-02-27
Payer: MEDICAID

## 2023-02-27 PROCEDURE — 99213 OFFICE O/P EST LOW 20 MIN: CPT

## 2023-02-27 PROCEDURE — 73564 X-RAY EXAM KNEE 4 OR MORE: CPT | Mod: LT

## 2023-02-28 NOTE — PHYSICAL EXAM
[de-identified] : ·Oriented to time, place, person\par ·Mood: Normal\par ·Affect: Normal\par ·Appearance: Healthy, well appearing, no acute distress.\par ·Gait: Antalgic\par ·Assistive Devices:None\par \par Right knee exam\par \par ·Skin: Clean, dry, intact\par ·Inspection: Valgus deformity, no masses, moderate swelling, min effusion\par ·Pulses: 2+ DP/PT pulses\par ·ROM: \par ·Tenderness: Tender MJL/LJLT\par ·Stability: Stable\par ·Strength: Intact Q/H/TA/GS/EHL, without atrophy\par ·Neuro: In tact to light touch throughout  [de-identified] : The following radiographs were ordered and read by me during this patients visit. I reviewed each radiograph in detail with the patient and discussed the findings as highlighted below. \par \par 4 views of the right knee were obtained today, 02/27/2023, that show no acute fracture or dislocation. There is moderate medial, severe lateral and moderate patellofemoral degenerative changes seen. There is valgus malalignment. No significant other obvious osseous abnormality, otherwise unremarkable. \par \par 4 views of the left knee were obtained today, 02/27/2023, that show no acute fracture or dislocation. There is moderate medial, mild lateral and moderate patellofemoral degenerative changes seen. There is no significant malalignment. No significant other obvious osseous abnormality, otherwise unremarkable.

## 2023-02-28 NOTE — DISCUSSION/SUMMARY
[de-identified] : 63 y/o female with right knee OA\par \par Presents with persistent right knee pain consistent with advanced arthrosis. Given the degree of arthrosis present on imaging, I discussed potential limitations of significant symptomatic improvement with prolonged conservative treatment as outline.  We discussed continued use of injection therapy for intermittent symptomatic relief.  However, given degree of valgus deformity the consideration at this time is for possible arthroplasty.  This was discussed in detail with the patient and the patient is agreeable to consultation with orthopedic arthroplasty.\par \par Recommendation: Activity modifications, rest, ice, weight loss, NSAIDs as needed.\par \par Referral provided for Orthopaedic Arthroplasty consultation. \par \par Follow up as needed.

## 2023-02-28 NOTE — HISTORY OF PRESENT ILLNESS
[de-identified] : 62 year old female with known right knee osteoarthritis presents today with right > left knee pain, worse over the last few months. Patient was treated for a right tibial plateau fx sustained 4/24/20. Reports pain and swelling of the knees following a lot of walking and more strenuous activities. Taking Motrin for pain. Denies buckling catching, numbness or tingling. Patient previously had a right knee aspiration and cortisone injection on 2/8/21, which helped her pain very well at the time.

## 2023-02-28 NOTE — ADDENDUM
[FreeTextEntry1] : This note was written by Sheila Diego on 02/27/2023 acting solely as a scribe for Dr. Patrick May.\par \par All medical record entries made by the Scribe were at my, Dr. Patrick May, direction and personally dictated by me on 02/27/2023. I have personally reviewed the chart and agree that the record accurately reflects my personal performance of the history, physical exam, assessment and plan.

## 2023-03-10 ENCOUNTER — APPOINTMENT (OUTPATIENT)
Dept: ORTHOPEDIC SURGERY | Facility: CLINIC | Age: 63
End: 2023-03-10
Payer: MEDICAID

## 2023-03-10 VITALS
HEART RATE: 59 BPM | WEIGHT: 170 LBS | DIASTOLIC BLOOD PRESSURE: 75 MMHG | BODY MASS INDEX: 31.28 KG/M2 | SYSTOLIC BLOOD PRESSURE: 136 MMHG | HEIGHT: 62 IN

## 2023-03-10 DIAGNOSIS — S82.141D DISPLACED BICONDYLAR FRACTURE OF RIGHT TIBIA, SUBSEQUENT ENCOUNTER FOR CLOSED FRACTURE WITH ROUTINE HEALING: ICD-10-CM

## 2023-03-10 PROCEDURE — 99215 OFFICE O/P EST HI 40 MIN: CPT

## 2023-03-14 PROBLEM — S82.141D CLOSED FRACTURE OF RIGHT TIBIAL PLATEAU WITH ROUTINE HEALING, SUBSEQUENT ENCOUNTER: Status: ACTIVE | Noted: 2020-04-27

## 2023-04-17 NOTE — HISTORY OF PRESENT ILLNESS
[FreeTextEntry1] : LEO Santo is a 62 year RHD woman presents today for her follow up s/p Right Humerus ORIF 8/29/22. Since her last visit she states she is feeling better. She is going to PT 3x a week. She is having occasional soreness around the incision.

## 2023-04-17 NOTE — PHYSICAL EXAM
[de-identified] : Ms. LEO KO is sitting comfortably in the exam room \par RIGHT arm\par -Incision is well-healed, no erythema, no signs of infection\par -FE: 160, ER: 20, IR: L5, ABD: 90\par -Able to make a fist\par -Sensation is intact median, radial, ulnar, axillary nerves\par -Motor is intact median, radial, ulnar, axillary nerves\par -Hand is warm and well-perfused, Palpable radial and ulnar pulses\par  [de-identified] : Xrays of the right humerus were taken in the office today, 2/23/23.  AP and lateral.  X-rays show excellent overall alignment of the humerus with excellent positioning of the plates and screws.  No lucencies around the screws.  No displacement of the implants or fracture.

## 2023-04-17 NOTE — DISCUSSION/SUMMARY
[de-identified] : 62-year-old woman s/p ORIF right humeral shaft, approximately 6 months out, doing well with some mild stiffness of the shoulder\par -Physical exam and x-ray findings were discussed with the patient\par -Weightbearing as tolerated right UE\par -Continue Physical therapy to improve ROM and strengthen\par -Follow-up in 6 months with x-rays at that time of the right humerus\par -All the patient's questions and concerns were addressed during this visit\par

## 2023-05-04 ENCOUNTER — OUTPATIENT (OUTPATIENT)
Dept: OUTPATIENT SERVICES | Facility: HOSPITAL | Age: 63
LOS: 1 days | End: 2023-05-04
Payer: MEDICAID

## 2023-05-04 ENCOUNTER — APPOINTMENT (OUTPATIENT)
Dept: CT IMAGING | Facility: CLINIC | Age: 63
End: 2023-05-04
Payer: MEDICAID

## 2023-05-04 DIAGNOSIS — M17.11 UNILATERAL PRIMARY OSTEOARTHRITIS, RIGHT KNEE: ICD-10-CM

## 2023-05-04 DIAGNOSIS — Z98.891 HISTORY OF UTERINE SCAR FROM PREVIOUS SURGERY: Chronic | ICD-10-CM

## 2023-05-04 DIAGNOSIS — Z98.890 OTHER SPECIFIED POSTPROCEDURAL STATES: Chronic | ICD-10-CM

## 2023-05-04 PROCEDURE — 73700 CT LOWER EXTREMITY W/O DYE: CPT

## 2023-05-04 PROCEDURE — 73700 CT LOWER EXTREMITY W/O DYE: CPT | Mod: 26,RT

## 2023-05-15 ENCOUNTER — OUTPATIENT (OUTPATIENT)
Dept: OUTPATIENT SERVICES | Facility: HOSPITAL | Age: 63
LOS: 1 days | End: 2023-05-15
Payer: MEDICAID

## 2023-05-15 VITALS
WEIGHT: 184.09 LBS | RESPIRATION RATE: 15 BRPM | SYSTOLIC BLOOD PRESSURE: 110 MMHG | TEMPERATURE: 98 F | HEART RATE: 61 BPM | OXYGEN SATURATION: 96 % | DIASTOLIC BLOOD PRESSURE: 72 MMHG | HEIGHT: 62 IN

## 2023-05-15 DIAGNOSIS — M17.11 UNILATERAL PRIMARY OSTEOARTHRITIS, RIGHT KNEE: ICD-10-CM

## 2023-05-15 DIAGNOSIS — Z98.891 HISTORY OF UTERINE SCAR FROM PREVIOUS SURGERY: Chronic | ICD-10-CM

## 2023-05-15 DIAGNOSIS — E11.9 TYPE 2 DIABETES MELLITUS WITHOUT COMPLICATIONS: ICD-10-CM

## 2023-05-15 DIAGNOSIS — Z98.890 OTHER SPECIFIED POSTPROCEDURAL STATES: Chronic | ICD-10-CM

## 2023-05-15 DIAGNOSIS — Z29.9 ENCOUNTER FOR PROPHYLACTIC MEASURES, UNSPECIFIED: ICD-10-CM

## 2023-05-15 DIAGNOSIS — Z01.818 ENCOUNTER FOR OTHER PREPROCEDURAL EXAMINATION: ICD-10-CM

## 2023-05-15 DIAGNOSIS — G47.33 OBSTRUCTIVE SLEEP APNEA (ADULT) (PEDIATRIC): ICD-10-CM

## 2023-05-15 DIAGNOSIS — S42.309A UNSPECIFIED FRACTURE OF SHAFT OF HUMERUS, UNSPECIFIED ARM, INITIAL ENCOUNTER FOR CLOSED FRACTURE: Chronic | ICD-10-CM

## 2023-05-15 LAB
A1C WITH ESTIMATED AVERAGE GLUCOSE RESULT: 7.4 % — HIGH (ref 4–5.6)
ANION GAP SERPL CALC-SCNC: 13 MMOL/L — SIGNIFICANT CHANGE UP (ref 5–17)
BLD GP AB SCN SERPL QL: NEGATIVE — SIGNIFICANT CHANGE UP
BUN SERPL-MCNC: 18 MG/DL — SIGNIFICANT CHANGE UP (ref 7–23)
CALCIUM SERPL-MCNC: 9.5 MG/DL — SIGNIFICANT CHANGE UP (ref 8.4–10.5)
CHLORIDE SERPL-SCNC: 102 MMOL/L — SIGNIFICANT CHANGE UP (ref 96–108)
CO2 SERPL-SCNC: 23 MMOL/L — SIGNIFICANT CHANGE UP (ref 22–31)
CREAT SERPL-MCNC: 0.59 MG/DL — SIGNIFICANT CHANGE UP (ref 0.5–1.3)
EGFR: 101 ML/MIN/1.73M2 — SIGNIFICANT CHANGE UP
ESTIMATED AVERAGE GLUCOSE: 166 MG/DL — HIGH (ref 68–114)
GLUCOSE SERPL-MCNC: 147 MG/DL — HIGH (ref 70–99)
HCT VFR BLD CALC: 41.9 % — SIGNIFICANT CHANGE UP (ref 34.5–45)
HGB BLD-MCNC: 13 G/DL — SIGNIFICANT CHANGE UP (ref 11.5–15.5)
MCHC RBC-ENTMCNC: 27.1 PG — SIGNIFICANT CHANGE UP (ref 27–34)
MCHC RBC-ENTMCNC: 31 GM/DL — LOW (ref 32–36)
MCV RBC AUTO: 87.3 FL — SIGNIFICANT CHANGE UP (ref 80–100)
MRSA PCR RESULT.: SIGNIFICANT CHANGE UP
NRBC # BLD: 0 /100 WBCS — SIGNIFICANT CHANGE UP (ref 0–0)
PLATELET # BLD AUTO: 250 K/UL — SIGNIFICANT CHANGE UP (ref 150–400)
POTASSIUM SERPL-MCNC: 4.7 MMOL/L — SIGNIFICANT CHANGE UP (ref 3.5–5.3)
POTASSIUM SERPL-SCNC: 4.7 MMOL/L — SIGNIFICANT CHANGE UP (ref 3.5–5.3)
RBC # BLD: 4.8 M/UL — SIGNIFICANT CHANGE UP (ref 3.8–5.2)
RBC # FLD: 12.4 % — SIGNIFICANT CHANGE UP (ref 10.3–14.5)
RH IG SCN BLD-IMP: NEGATIVE — SIGNIFICANT CHANGE UP
S AUREUS DNA NOSE QL NAA+PROBE: DETECTED
SODIUM SERPL-SCNC: 138 MMOL/L — SIGNIFICANT CHANGE UP (ref 135–145)
WBC # BLD: 4.18 K/UL — SIGNIFICANT CHANGE UP (ref 3.8–10.5)
WBC # FLD AUTO: 4.18 K/UL — SIGNIFICANT CHANGE UP (ref 3.8–10.5)

## 2023-05-15 PROCEDURE — G0463: CPT

## 2023-05-15 PROCEDURE — 87641 MR-STAPH DNA AMP PROBE: CPT

## 2023-05-15 PROCEDURE — 80048 BASIC METABOLIC PNL TOTAL CA: CPT

## 2023-05-15 PROCEDURE — 83036 HEMOGLOBIN GLYCOSYLATED A1C: CPT

## 2023-05-15 PROCEDURE — 86900 BLOOD TYPING SEROLOGIC ABO: CPT

## 2023-05-15 PROCEDURE — 87640 STAPH A DNA AMP PROBE: CPT

## 2023-05-15 PROCEDURE — 86850 RBC ANTIBODY SCREEN: CPT

## 2023-05-15 PROCEDURE — 85027 COMPLETE CBC AUTOMATED: CPT

## 2023-05-15 PROCEDURE — 86901 BLOOD TYPING SEROLOGIC RH(D): CPT

## 2023-05-15 RX ORDER — FUROSEMIDE 40 MG
1 TABLET ORAL
Qty: 0 | Refills: 0 | DISCHARGE

## 2023-05-15 NOTE — H&P PST ADULT - ASSESSMENT
DASI score: 5.5  DASI activity: activity level limited by knees pain, able to walk up 2 flights of stairs, walk 15 minutes, housework  Loose teeth or denture: denies     CAPRINI SCORE    AGE RELATED RISK FACTORS                                                       MOBILITY RELATED FACTORS  [ ] Age 41-60 years                                            (1 Point)                  [ ] Bed rest                                                        (1 Point)  [ ] Age: 61-74 years                                           (2 Points)                [ ] Plaster cast                                                   (2 Points)  [ ] Age= 75 years                                              (3 Points)                 [ ] Bed bound for more than 72 hours                   (2 Points)    DISEASE RELATED RISK FACTORS                                               GENDER SPECIFIC FACTORS  [ ] Edema in the lower extremities                       (1 Point)                  [ ] Pregnancy                                                     (1 Point)  [ ] Varicose veins                                               (1 Point)                  [ ] Post-partum < 6 weeks                                   (1 Point)             [ ] BMI > 25 Kg/m2                                            (1 Point)                  [ ] Hormonal therapy  or oral contraception            (1 Point)                 [ ] Sepsis (in the previous month)                        (1 Point)                  [ ] History of pregnancy complications  [ ] Pneumonia or serious lung disease                                               [ ] Unexplained or recurrent                       (1 Point)           (in the previous month)                               (1 Point)  [ ] Abnormal pulmonary function test                     (1 Point)                 SURGERY RELATED RISK FACTORS  [ ] Acute myocardial infarction                              (1 Point)                 [ ]  Section                                            (1 Point)  [ ] Congestive heart failure (in the previous month)  (1 Point)                 [ ] Minor surgery                                                 (1 Point)   [ ] Inflammatory bowel disease                             (1 Point)                 [ ] Arthroscopic surgery                                        (2 Points)  [ ] Central venous access                                    (2 Points)                [ ] General surgery lasting more than 45 minutes   (2 Points)       [ ] Stroke (in the previous month)                          (5 Points)               [ ] Elective arthroplasty                                        (5 Points)                                                                                                                                               HEMATOLOGY RELATED FACTORS                                                 TRAUMA RELATED RISK FACTORS  [ ] Prior episodes of VTE                                     (3 Points)                 [ ] Fracture of the hip, pelvis, or leg                       (5 Points)  [ ] Positive family history for VTE                         (3 Points)                 [ ] Acute spinal cord injury (in the previous month)  (5 Points)  [ ] Prothrombin 62518 A                                      (3 Points)                 [ ] Paralysis  (less than 1 month)                          (5 Points)  [ ] Factor V Leiden                                             (3 Points)                 [ ] Multiple Trauma within 1 month                         (5 Points)  [ ] Lupus anticoagulants                                     (3 Points)                                                           [ ] Anticardiolipin antibodies                                (3 Points)                                                       [ ] High homocysteine in the blood                      (3 Points)                                             [ ] Other congenital or acquired thrombophilia       (3 Points)                                                [ ] Heparin induced thrombocytopenia                  (3 Points)                                          Total Score [          ] DASI score: 5.5  DxASI activity: activity level limited by knees pain, able to walk up 2 flights of stairs, walk 15 minutes, housework  Loose teeth or denture: denies     CAPRINI SCORE    AGE RELATED RISK FACTORS                                                       MOBILITY RELATED FACTORS  [ ] Age 41-60 years                                            (1 Point)                  [ ] Bed rest                                                        (1 Point)  [x ] Age: 61-74 years                                           (2 Points)                [ ] Plaster cast                                                   (2 Points)  [ ] Age= 75 years                                              (3 Points)                 [ ] Bed bound for more than 72 hours                   (2 Points)    DISEASE RELATED RISK FACTORS                                               GENDER SPECIFIC FACTORS  [ ] Edema in the lower extremities                       (1 Point)                  [ ] Pregnancy                                                     (1 Point)  [ ] Varicose veins                                               (1 Point)                  [ ] Post-partum < 6 weeks                                   (1 Point)             [x ] BMI > 25 Kg/m2                                            (1 Point)                  [ ] Hormonal therapy  or oral contraception            (1 Point)                 [ ] Sepsis (in the previous month)                        (1 Point)                  [ ] History of pregnancy complications  [ ] Pneumonia or serious lung disease                                               [ ] Unexplained or recurrent                       (1 Point)           (in the previous month)                               (1 Point)  [ ] Abnormal pulmonary function test                     (1 Point)                 SURGERY RELATED RISK FACTORS  [ ] Acute myocardial infarction                              (1 Point)                 [ ]  Section                                            (1 Point)  [x ] Congestive heart failure (in the previous month)  (1 Point)                 [ ] Minor surgery                                                 (1 Point)   [ ] Inflammatory bowel disease                             (1 Point)                 [ ] Arthroscopic surgery                                        (2 Points)  [ ] Central venous access                                    (2 Points)                [ ] General surgery lasting more than 45 minutes   (2 Points)       [ ] Stroke (in the previous month)                          (5 Points)               [ x] Elective arthroplasty                                        (5 Points)                                                                                                                                               HEMATOLOGY RELATED FACTORS                                                 TRAUMA RELATED RISK FACTORS  [ ] Prior episodes of VTE                                     (3 Points)                 [ ] Fracture of the hip, pelvis, or leg                       (5 Points)  [ ] Positive family history for VTE                         (3 Points)                 [ ] Acute spinal cord injury (in the previous month)  (5 Points)  [ ] Prothrombin 32984 A                                      (3 Points)                 [ ] Paralysis  (less than 1 month)                          (5 Points)  [ ] Factor V Leiden                                             (3 Points)                 [ ] Multiple Trauma within 1 month                         (5 Points)  [ ] Lupus anticoagulants                                     (3 Points)                                                           [ ] Anticardiolipin antibodies                                (3 Points)                                                       [ ] High homocysteine in the blood                      (3 Points)                                             [ ] Other congenital or acquired thrombophilia       (3 Points)                                                [ ] Heparin induced thrombocytopenia                  (3 Points)                                          Total Score [    9      ]

## 2023-05-15 NOTE — H&P PST ADULT - HISTORY OF PRESENT ILLNESS
63 yo female. PMH rheumatoid arthritis (on enbrel), HTN, HLD, T2DM (PaV2v=8+), PAPO (does not tolerate mouth piece), CHF (on entresto, last echo EF=45% as documented on cardiac evaluation note),    aggravated with changing position from sitting to standing and also with movements  Have you had a CT KARISHMA done? Yes - done on 5/4/2023 report in [facility] 64 yo very pleasant female. PMH rheumatoid arthritis (on enbrel), HTN, HLD, T2DM (TnJ3n=4+), PAPO (does not tolerate mouth piece), CHF (on entresto, last echo EF=45% as documented on cardiac evaluation note), pt c/o chronic bilateral knees pain, R>L, progressively getting worse, 6-8/10, constant, achy, sharp at times, aggravated with changing position from sitting to standing and also with movements, failed nonsurgical interventions, evaluated by Dr. Benoit, now presents to PST scheduled for right TKR with Shane Robot on 6/5.   Have you had a CT SHANE done? Yes - done on 5/4/2023 report in [facility]

## 2023-05-15 NOTE — H&P PST ADULT - NSICDXPASTSURGICALHX_GEN_ALL_CORE_FT
PAST SURGICAL HISTORY:  Closed fracture of humerus     H/O arthroscopy of left knee     S/P  section     S/P excision of lipoma back  left forearm    S/P lumpectomy, left breast benign    Status post de Quervain's release surgery VANIA

## 2023-05-15 NOTE — H&P PST ADULT - NSICDXPASTMEDICALHX_GEN_ALL_CORE_FT
PAST MEDICAL HISTORY:  GERD (gastroesophageal reflux disease)     H/O eczema     History of heart failure diagnosed with 3/2022    PAPO (obstructive sleep apnea)     Rheumatoid arthritis     Type 2 diabetes mellitus      PAST MEDICAL HISTORY:  Cardiomyopathy, nonischemic     GERD (gastroesophageal reflux disease)     H/O eczema     History of heart failure diagnosed with 3/2022    PAPO (obstructive sleep apnea)     Rheumatoid arthritis     Type 2 diabetes mellitus

## 2023-05-17 ENCOUNTER — NON-APPOINTMENT (OUTPATIENT)
Age: 63
End: 2023-05-17

## 2023-05-17 DIAGNOSIS — Z22.321 CARRIER OR SUSPECTED CARRIER OF METHICILLIN SUSCEPTIBLE STAPHYLOCOCCUS AUREUS: ICD-10-CM

## 2023-05-17 RX ORDER — MUPIROCIN 20 MG/G
2 OINTMENT TOPICAL
Qty: 1 | Refills: 0 | Status: ACTIVE | COMMUNITY
Start: 2023-05-17 | End: 1900-01-01

## 2023-06-05 ENCOUNTER — APPOINTMENT (OUTPATIENT)
Dept: ORTHOPEDIC SURGERY | Facility: HOSPITAL | Age: 63
End: 2023-06-05

## 2023-06-07 PROBLEM — I42.8 OTHER CARDIOMYOPATHIES: Chronic | Status: ACTIVE | Noted: 2023-05-15

## 2023-06-13 ENCOUNTER — OUTPATIENT (OUTPATIENT)
Dept: OUTPATIENT SERVICES | Facility: HOSPITAL | Age: 63
LOS: 1 days | End: 2023-06-13
Payer: MEDICAID

## 2023-06-13 VITALS
HEART RATE: 70 BPM | TEMPERATURE: 99 F | WEIGHT: 182.98 LBS | DIASTOLIC BLOOD PRESSURE: 68 MMHG | HEIGHT: 62 IN | SYSTOLIC BLOOD PRESSURE: 106 MMHG | OXYGEN SATURATION: 97 %

## 2023-06-13 DIAGNOSIS — Z98.890 OTHER SPECIFIED POSTPROCEDURAL STATES: Chronic | ICD-10-CM

## 2023-06-13 DIAGNOSIS — E11.9 TYPE 2 DIABETES MELLITUS WITHOUT COMPLICATIONS: ICD-10-CM

## 2023-06-13 DIAGNOSIS — Z01.818 ENCOUNTER FOR OTHER PREPROCEDURAL EXAMINATION: ICD-10-CM

## 2023-06-13 DIAGNOSIS — M17.11 UNILATERAL PRIMARY OSTEOARTHRITIS, RIGHT KNEE: ICD-10-CM

## 2023-06-13 DIAGNOSIS — Z29.9 ENCOUNTER FOR PROPHYLACTIC MEASURES, UNSPECIFIED: ICD-10-CM

## 2023-06-13 DIAGNOSIS — S42.309A UNSPECIFIED FRACTURE OF SHAFT OF HUMERUS, UNSPECIFIED ARM, INITIAL ENCOUNTER FOR CLOSED FRACTURE: Chronic | ICD-10-CM

## 2023-06-13 DIAGNOSIS — Z98.891 HISTORY OF UTERINE SCAR FROM PREVIOUS SURGERY: Chronic | ICD-10-CM

## 2023-06-13 LAB
ANION GAP SERPL CALC-SCNC: 16 MMOL/L — SIGNIFICANT CHANGE UP (ref 5–17)
BLD GP AB SCN SERPL QL: NEGATIVE — SIGNIFICANT CHANGE UP
BUN SERPL-MCNC: 11 MG/DL — SIGNIFICANT CHANGE UP (ref 7–23)
CALCIUM SERPL-MCNC: 9.1 MG/DL — SIGNIFICANT CHANGE UP (ref 8.4–10.5)
CHLORIDE SERPL-SCNC: 100 MMOL/L — SIGNIFICANT CHANGE UP (ref 96–108)
CO2 SERPL-SCNC: 20 MMOL/L — LOW (ref 22–31)
CREAT SERPL-MCNC: 0.61 MG/DL — SIGNIFICANT CHANGE UP (ref 0.5–1.3)
EGFR: 100 ML/MIN/1.73M2 — SIGNIFICANT CHANGE UP
GLUCOSE SERPL-MCNC: 187 MG/DL — HIGH (ref 70–99)
HCT VFR BLD CALC: 39.7 % — SIGNIFICANT CHANGE UP (ref 34.5–45)
HGB BLD-MCNC: 12.6 G/DL — SIGNIFICANT CHANGE UP (ref 11.5–15.5)
MCHC RBC-ENTMCNC: 27.3 PG — SIGNIFICANT CHANGE UP (ref 27–34)
MCHC RBC-ENTMCNC: 31.7 GM/DL — LOW (ref 32–36)
MCV RBC AUTO: 85.9 FL — SIGNIFICANT CHANGE UP (ref 80–100)
MRSA PCR RESULT.: SIGNIFICANT CHANGE UP
NRBC # BLD: 0 /100 WBCS — SIGNIFICANT CHANGE UP (ref 0–0)
PLATELET # BLD AUTO: 183 K/UL — SIGNIFICANT CHANGE UP (ref 150–400)
POTASSIUM SERPL-MCNC: 3.7 MMOL/L — SIGNIFICANT CHANGE UP (ref 3.5–5.3)
POTASSIUM SERPL-SCNC: 3.7 MMOL/L — SIGNIFICANT CHANGE UP (ref 3.5–5.3)
RBC # BLD: 4.62 M/UL — SIGNIFICANT CHANGE UP (ref 3.8–5.2)
RBC # FLD: 12.6 % — SIGNIFICANT CHANGE UP (ref 10.3–14.5)
RH IG SCN BLD-IMP: NEGATIVE — SIGNIFICANT CHANGE UP
S AUREUS DNA NOSE QL NAA+PROBE: DETECTED
SODIUM SERPL-SCNC: 136 MMOL/L — SIGNIFICANT CHANGE UP (ref 135–145)
WBC # BLD: 6 K/UL — SIGNIFICANT CHANGE UP (ref 3.8–10.5)
WBC # FLD AUTO: 6 K/UL — SIGNIFICANT CHANGE UP (ref 3.8–10.5)

## 2023-06-13 PROCEDURE — 87641 MR-STAPH DNA AMP PROBE: CPT

## 2023-06-13 PROCEDURE — 86901 BLOOD TYPING SEROLOGIC RH(D): CPT

## 2023-06-13 PROCEDURE — 85027 COMPLETE CBC AUTOMATED: CPT

## 2023-06-13 PROCEDURE — 80048 BASIC METABOLIC PNL TOTAL CA: CPT

## 2023-06-13 PROCEDURE — 87640 STAPH A DNA AMP PROBE: CPT

## 2023-06-13 PROCEDURE — 86850 RBC ANTIBODY SCREEN: CPT

## 2023-06-13 PROCEDURE — 86900 BLOOD TYPING SEROLOGIC ABO: CPT

## 2023-06-13 NOTE — H&P PST ADULT - NSANTHOSAYNRD_GEN_A_CORE
Yes Of note, Pt. tried to use mouthpiece, given by dentist, but wasn't able to tolerate it. Never went for testing. Reports snoring only./No. PAPO screening performed.  STOP BANG Legend: 0-2 = LOW Risk; 3-4 = INTERMEDIATE Risk; 5-8 = HIGH Risk

## 2023-06-13 NOTE — H&P PST ADULT - PROBLEM SELECTOR PLAN 3
PAPO precautions  OR booking notified The Caprini score indicates that this patient is at high risk for a VTE event (score 6 or greater). Surgical patients in this group will benefit from both pharmacologic prophylaxis and intermittent compression devices.  The surgical team will determine the balance between VTE risk and bleeding risk, and other clinical considerations

## 2023-06-13 NOTE — H&P PST ADULT - ASSESSMENT
DASI score: 5.5  DxASI activity: activity level limited by knees pain, able to walk up 2 flights of stairs, walk 15 minutes, housework  Loose teeth or denture: denies     CAPRINI SCORE    AGE RELATED RISK FACTORS                                                       MOBILITY RELATED FACTORS  [ ] Age 41-60 years                                            (1 Point)                  [ ] Bed rest                                                        (1 Point)  [x ] Age: 61-74 years                                           (2 Points)                [ ] Plaster cast                                                   (2 Points)  [ ] Age= 75 years                                              (3 Points)                 [ ] Bed bound for more than 72 hours                   (2 Points)    DISEASE RELATED RISK FACTORS                                               GENDER SPECIFIC FACTORS  [ ] Edema in the lower extremities                       (1 Point)                  [ ] Pregnancy                                                     (1 Point)  [ ] Varicose veins                                               (1 Point)                  [ ] Post-partum < 6 weeks                                   (1 Point)             [x ] BMI > 25 Kg/m2                                            (1 Point)                  [ ] Hormonal therapy  or oral contraception            (1 Point)                 [ ] Sepsis (in the previous month)                        (1 Point)                  [ ] History of pregnancy complications  [ ] Pneumonia or serious lung disease                                               [ ] Unexplained or recurrent                       (1 Point)           (in the previous month)                               (1 Point)  [ ] Abnormal pulmonary function test                     (1 Point)                 SURGERY RELATED RISK FACTORS  [ ] Acute myocardial infarction                              (1 Point)                 [ ]  Section                                            (1 Point)  [x ] Congestive heart failure (in the previous month)  (1 Point)                 [ ] Minor surgery                                                 (1 Point)   [ ] Inflammatory bowel disease                             (1 Point)                 [ ] Arthroscopic surgery                                        (2 Points)  [ ] Central venous access                                    (2 Points)                [ ] General surgery lasting more than 45 minutes   (2 Points)       [ ] Stroke (in the previous month)                          (5 Points)               [ x] Elective arthroplasty                                        (5 Points)                                                                                                                                               HEMATOLOGY RELATED FACTORS                                                 TRAUMA RELATED RISK FACTORS  [ ] Prior episodes of VTE                                     (3 Points)                 [ ] Fracture of the hip, pelvis, or leg                       (5 Points)  [ ] Positive family history for VTE                         (3 Points)                 [ ] Acute spinal cord injury (in the previous month)  (5 Points)  [ ] Prothrombin 86142 A                                      (3 Points)                 [ ] Paralysis  (less than 1 month)                          (5 Points)  [ ] Factor V Leiden                                             (3 Points)                 [ ] Multiple Trauma within 1 month                         (5 Points)  [ ] Lupus anticoagulants                                     (3 Points)                                                           [ ] Anticardiolipin antibodies                                (3 Points)                                                       [ ] High homocysteine in the blood                      (3 Points)                                             [ ] Other congenital or acquired thrombophilia       (3 Points)                                                [ ] Heparin induced thrombocytopenia                  (3 Points)                                          Total Score [    9      ]

## 2023-06-13 NOTE — H&P PST ADULT - HISTORY OF PRESENT ILLNESS
64 yo very pleasant female. PMH rheumatoid arthritis (on enbrel), HTN, HLD, T2DM (JiE1l=9+), PAPO (does not tolerate mouth piece), CHF (on entresto, last echo EF=45% as documented on cardiac evaluation note), pt c/o chronic bilateral knees pain, R>L, progressively getting worse, 6-8/10, constant, achy, sharp at times, aggravated with changing position from sitting to standing and also with movements, failed nonsurgical interventions, evaluated by Dr. Benoit, now presents to PST scheduled for right TKR with Shane Robot on 6/5.   Have you had a CT SHANE done? Yes - done on 5/4/2023 report in [facility] 62 yo very pleasant female. PMH rheumatoid arthritis (on enbrel), HTN, HLD, T2DM (AkJ1k=4+), PAPO (does not tolerate mouth piece), CHF (on entresto, last echo EF=45% as documented on cardiac evaluation note), pt c/o chronic bilateral knees pain, R>L, progressively getting worse, 6-8/10, constant, achy, sharp at times, aggravated with changing position from sitting to standing and also with movements, failed nonsurgical interventions, evaluated by Dr. Benoit, now presents to PST scheduled for right TKR with Shane Robot on 6/21/23.      *** previous surgery in 6/5/23 was cancelled due to insurance issues     Have you had a CT SHANE done? Yes - done on 5/4/2023 report in [facility] 64 yo very pleasant female. PMH rheumatoid arthritis (on enbrel), HTN, HLD, T2DM (AkN2g=6+), CHF (on entresto, last echo EF=45% as documented on cardiac evaluation note), pt c/o chronic bilateral knees pain, R>L, progressively getting worse, 6-8/10, constant, achy, sharp at times, aggravated with changing position from sitting to standing and also with movements, failed nonsurgical interventions, evaluated by Dr. Benoit, now presents to PST scheduled for right TKR with Shane Robot on 6/21/23.      *** previous surgery in 6/5/23 was cancelled due to insurance issues     Have you had a CT SHANE done? Yes - done on 5/4/2023 report in [facility]

## 2023-06-13 NOTE — H&P PST ADULT - NSICDXPASTMEDICALHX_GEN_ALL_CORE_FT
PAST MEDICAL HISTORY:  Cardiomyopathy, nonischemic     GERD (gastroesophageal reflux disease)     H/O eczema     History of heart failure diagnosed with 3/2022    PAPO (obstructive sleep apnea)     Rheumatoid arthritis     Type 2 diabetes mellitus      PAST MEDICAL HISTORY:  Cardiomyopathy, nonischemic     GERD (gastroesophageal reflux disease)     H/O eczema     History of heart failure diagnosed with 3/2022    Rheumatoid arthritis     Type 2 diabetes mellitus

## 2023-06-13 NOTE — H&P PST ADULT - PROBLEM SELECTOR PLAN 1
The Caprini score indicates that this patient is at high risk for a VTE event (score 6 or greater). Surgical patients in this group will benefit from both pharmacologic prophylaxis and intermittent compression devices.  The surgical team will determine the balance between VTE risk and bleeding risk, and other clinical considerations Right Total Knee Replacement with Shane   pre-op instructions discussed   labs repeated   CT done 5/4/23

## 2023-06-19 ENCOUNTER — NON-APPOINTMENT (OUTPATIENT)
Age: 63
End: 2023-06-19

## 2023-06-21 ENCOUNTER — APPOINTMENT (OUTPATIENT)
Dept: RADIOLOGY | Facility: CLINIC | Age: 63
End: 2023-06-21
Payer: MEDICAID

## 2023-06-21 ENCOUNTER — OUTPATIENT (OUTPATIENT)
Dept: OUTPATIENT SERVICES | Facility: HOSPITAL | Age: 63
LOS: 1 days | End: 2023-06-21
Payer: MEDICAID

## 2023-06-21 DIAGNOSIS — R05.3 CHRONIC COUGH: ICD-10-CM

## 2023-06-21 DIAGNOSIS — Z00.8 ENCOUNTER FOR OTHER GENERAL EXAMINATION: ICD-10-CM

## 2023-06-21 DIAGNOSIS — Z98.890 OTHER SPECIFIED POSTPROCEDURAL STATES: Chronic | ICD-10-CM

## 2023-06-21 DIAGNOSIS — Z98.891 HISTORY OF UTERINE SCAR FROM PREVIOUS SURGERY: Chronic | ICD-10-CM

## 2023-06-21 DIAGNOSIS — S42.309A UNSPECIFIED FRACTURE OF SHAFT OF HUMERUS, UNSPECIFIED ARM, INITIAL ENCOUNTER FOR CLOSED FRACTURE: Chronic | ICD-10-CM

## 2023-06-21 PROCEDURE — 71046 X-RAY EXAM CHEST 2 VIEWS: CPT | Mod: 26

## 2023-06-21 PROCEDURE — 71046 X-RAY EXAM CHEST 2 VIEWS: CPT

## 2023-06-28 ENCOUNTER — FORM ENCOUNTER (OUTPATIENT)
Age: 63
End: 2023-06-28

## 2023-06-28 ENCOUNTER — APPOINTMENT (OUTPATIENT)
Dept: ORTHOPEDIC SURGERY | Facility: CLINIC | Age: 63
End: 2023-06-28
Payer: OTHER MISCELLANEOUS

## 2023-06-28 DIAGNOSIS — S42.341D DISPLACED SPIRAL FRACTURE OF SHAFT OF HUMERUS, RIGHT ARM, SUBSEQUENT ENCOUNTER FOR FRACTURE WITH ROUTINE HEALING: ICD-10-CM

## 2023-06-28 PROCEDURE — 73030 X-RAY EXAM OF SHOULDER: CPT | Mod: RT

## 2023-06-28 PROCEDURE — 99213 OFFICE O/P EST LOW 20 MIN: CPT

## 2023-06-28 NOTE — DISCUSSION/SUMMARY
[de-identified] : 63-year-old woman s/p ORIF right humeral shaft, approximately 10 months out, doing great with some mild stiffness of the shoulder\par -X-ray and physical exam findings were discussed with the patient\par -Weightbearing as tolerated right UE\par -Continue PT for strength of the right arm\par -Follow-up prn with x-rays of the right humerus at that time\par -All the patient's questions and concerns were addressed during this visit\par

## 2023-06-28 NOTE — PHYSICAL EXAM
[de-identified] : Ms. LEO KO is sitting comfortably in the exam room \par RIGHT arm\par -Incision is well-healed, no erythema, no signs of infection\par -FE: 160, ER: 20, IR: L5, ABD: 90\par -Able to make a fist\par -Sensation is intact median, radial, ulnar, axillary nerves\par -Motor is intact median, radial, ulnar, axillary nerves\par -Hand is warm and well-perfused, Palpable radial and ulnar pulses\par  [de-identified] : Xrays of the right humerus were taken in the office today, 6/28/23.  AP and lateral.  X-rays show excellent overall alignment of the humerus with excellent positioning of the plates and screws.  There is interval healing at the fracture site.  No lucencies around the screws.  No displacement of the implants or fracture.

## 2023-06-28 NOTE — HISTORY OF PRESENT ILLNESS
[de-identified] : LEO Santo is a 63 y.o. RHD woman presents today for her follow up s/p Right Humerus ORIF on 8/29/22. Since her last visit she states she is feeling better. She is participating in PT 3 times a week. She notes mild pain at the right shoulder with repetitive movements. She will occasionally take Tylenol or Motrin for the pain.

## 2023-06-30 ENCOUNTER — APPOINTMENT (OUTPATIENT)
Dept: ORTHOPEDIC SURGERY | Facility: CLINIC | Age: 63
End: 2023-06-30
Payer: MEDICAID

## 2023-06-30 VITALS
HEART RATE: 58 BPM | SYSTOLIC BLOOD PRESSURE: 102 MMHG | HEIGHT: 62 IN | WEIGHT: 180 LBS | DIASTOLIC BLOOD PRESSURE: 67 MMHG | BODY MASS INDEX: 33.13 KG/M2

## 2023-06-30 DIAGNOSIS — M17.11 UNILATERAL PRIMARY OSTEOARTHRITIS, RIGHT KNEE: ICD-10-CM

## 2023-06-30 PROCEDURE — 99215 OFFICE O/P EST HI 40 MIN: CPT

## 2023-07-04 ENCOUNTER — FORM ENCOUNTER (OUTPATIENT)
Age: 63
End: 2023-07-04

## 2023-07-06 PROBLEM — M17.11 PRIMARY OSTEOARTHRITIS OF RIGHT KNEE: Status: ACTIVE | Noted: 2020-04-27

## 2023-07-17 ENCOUNTER — OUTPATIENT (OUTPATIENT)
Dept: OUTPATIENT SERVICES | Facility: HOSPITAL | Age: 63
LOS: 1 days | End: 2023-07-17
Payer: MEDICAID

## 2023-07-17 VITALS
DIASTOLIC BLOOD PRESSURE: 77 MMHG | TEMPERATURE: 98 F | RESPIRATION RATE: 20 BRPM | OXYGEN SATURATION: 96 % | WEIGHT: 177.91 LBS | SYSTOLIC BLOOD PRESSURE: 125 MMHG | HEIGHT: 62 IN | HEART RATE: 64 BPM

## 2023-07-17 DIAGNOSIS — Z98.890 OTHER SPECIFIED POSTPROCEDURAL STATES: Chronic | ICD-10-CM

## 2023-07-17 DIAGNOSIS — S42.309A UNSPECIFIED FRACTURE OF SHAFT OF HUMERUS, UNSPECIFIED ARM, INITIAL ENCOUNTER FOR CLOSED FRACTURE: Chronic | ICD-10-CM

## 2023-07-17 DIAGNOSIS — M17.11 UNILATERAL PRIMARY OSTEOARTHRITIS, RIGHT KNEE: ICD-10-CM

## 2023-07-17 DIAGNOSIS — Z98.891 HISTORY OF UTERINE SCAR FROM PREVIOUS SURGERY: Chronic | ICD-10-CM

## 2023-07-17 LAB
A1C WITH ESTIMATED AVERAGE GLUCOSE RESULT: 7.8 % — HIGH (ref 4–5.6)
ANION GAP SERPL CALC-SCNC: 12 MMOL/L — SIGNIFICANT CHANGE UP (ref 5–17)
BLD GP AB SCN SERPL QL: NEGATIVE — SIGNIFICANT CHANGE UP
BUN SERPL-MCNC: 13 MG/DL — SIGNIFICANT CHANGE UP (ref 7–23)
CALCIUM SERPL-MCNC: 9.6 MG/DL — SIGNIFICANT CHANGE UP (ref 8.4–10.5)
CHLORIDE SERPL-SCNC: 104 MMOL/L — SIGNIFICANT CHANGE UP (ref 96–108)
CO2 SERPL-SCNC: 24 MMOL/L — SIGNIFICANT CHANGE UP (ref 22–31)
CREAT SERPL-MCNC: 0.64 MG/DL — SIGNIFICANT CHANGE UP (ref 0.5–1.3)
EGFR: 99 ML/MIN/1.73M2 — SIGNIFICANT CHANGE UP
ESTIMATED AVERAGE GLUCOSE: 177 MG/DL — HIGH (ref 68–114)
GLUCOSE SERPL-MCNC: 146 MG/DL — HIGH (ref 70–99)
HCT VFR BLD CALC: 43.9 % — SIGNIFICANT CHANGE UP (ref 34.5–45)
HGB BLD-MCNC: 13.8 G/DL — SIGNIFICANT CHANGE UP (ref 11.5–15.5)
MCHC RBC-ENTMCNC: 27.8 PG — SIGNIFICANT CHANGE UP (ref 27–34)
MCHC RBC-ENTMCNC: 31.4 GM/DL — LOW (ref 32–36)
MCV RBC AUTO: 88.3 FL — SIGNIFICANT CHANGE UP (ref 80–100)
MRSA PCR RESULT.: SIGNIFICANT CHANGE UP
NRBC # BLD: 0 /100 WBCS — SIGNIFICANT CHANGE UP (ref 0–0)
PLATELET # BLD AUTO: 245 K/UL — SIGNIFICANT CHANGE UP (ref 150–400)
POTASSIUM SERPL-MCNC: 3.9 MMOL/L — SIGNIFICANT CHANGE UP (ref 3.5–5.3)
POTASSIUM SERPL-SCNC: 3.9 MMOL/L — SIGNIFICANT CHANGE UP (ref 3.5–5.3)
RBC # BLD: 4.97 M/UL — SIGNIFICANT CHANGE UP (ref 3.8–5.2)
RBC # FLD: 12.8 % — SIGNIFICANT CHANGE UP (ref 10.3–14.5)
RH IG SCN BLD-IMP: NEGATIVE — SIGNIFICANT CHANGE UP
S AUREUS DNA NOSE QL NAA+PROBE: DETECTED
SODIUM SERPL-SCNC: 140 MMOL/L — SIGNIFICANT CHANGE UP (ref 135–145)
WBC # BLD: 5.03 K/UL — SIGNIFICANT CHANGE UP (ref 3.8–10.5)
WBC # FLD AUTO: 5.03 K/UL — SIGNIFICANT CHANGE UP (ref 3.8–10.5)

## 2023-07-17 PROCEDURE — G0463: CPT

## 2023-07-17 PROCEDURE — 87641 MR-STAPH DNA AMP PROBE: CPT

## 2023-07-17 PROCEDURE — 83036 HEMOGLOBIN GLYCOSYLATED A1C: CPT

## 2023-07-17 PROCEDURE — 86900 BLOOD TYPING SEROLOGIC ABO: CPT

## 2023-07-17 PROCEDURE — 80048 BASIC METABOLIC PNL TOTAL CA: CPT

## 2023-07-17 PROCEDURE — 86901 BLOOD TYPING SEROLOGIC RH(D): CPT

## 2023-07-17 PROCEDURE — 86850 RBC ANTIBODY SCREEN: CPT

## 2023-07-17 PROCEDURE — 85027 COMPLETE CBC AUTOMATED: CPT

## 2023-07-17 PROCEDURE — 87640 STAPH A DNA AMP PROBE: CPT

## 2023-07-17 NOTE — H&P PST ADULT - PROBLEM SELECTOR PLAN 2
last dose jardiance 6/17/23  last dose metformin 6/20/21   check finger stick day of surgery last dose jardiance 8/4/23  last dose metformin 8/6/23  check finger stick day of surgery

## 2023-07-17 NOTE — H&P PST ADULT - NSICDXPASTMEDICALHX_GEN_ALL_CORE_FT
PAST MEDICAL HISTORY:  Cardiomyopathy, nonischemic     GERD (gastroesophageal reflux disease)     H/O eczema     History of heart failure diagnosed with 3/2022    Rheumatoid arthritis     Type 2 diabetes mellitus

## 2023-07-17 NOTE — H&P PST ADULT - NSANTHOSAYNRD_GEN_A_CORE
Of note, Pt. tried to use mouthpiece, given by dentist, but wasn't able to tolerate it. Never went for testing. Reports snoring only./No. PAPO screening performed.  STOP BANG Legend: 0-2 = LOW Risk; 3-4 = INTERMEDIATE Risk; 5-8 = HIGH Risk

## 2023-07-17 NOTE — H&P PST ADULT - HISTORY OF PRESENT ILLNESS
64 yo very pleasant female. PMH rheumatoid arthritis (on enbrel), HTN, HLD, T2DM (ImQ2u=4+), CHF (on entresto, last echo EF=45% as documented on cardiac evaluation note), pt c/o chronic bilateral knees pain, R>L, progressively getting worse, 6-8/10, constant, achy, sharp at times, aggravated with changing position from sitting to standing and also with movements, failed nonsurgical interventions, evaluated by Dr. Benoit, now presents to [facility] scheduled for right TKR with Shane Robot on 6/21/23.      *** previous surgery in 6/5/23 was cancelled due to patient illness.     Have you had a CT SHANE done? Yes - done on 5/4/2023 report in [facility] 64 yo very pleasant female. PMH rheumatoid arthritis (on enbrel), HTN, HLD, T2DM (OpU9t=0+), CHF (on entresto, last echo EF=45% as documented on cardiac evaluation note), pt c/o chronic bilateral knees pain, R>L, progressively getting worse, 6-8/10, constant, achy, sharp at times, aggravated with changing position from sitting to standing and also with movements, failed nonsurgical interventions, evaluated by Dr. Benoit, now presents to PST scheduled for right TKR with Shane Robot on 6/21/23.      *** previous surgery in 6/5/23 was cancelled due to patient illness. mild cold, now feeling fine    Have you had a CT SHANE done? Yes - done on 5/4/2023 report in [facility]

## 2023-07-17 NOTE — H&P PST ADULT - PROBLEM SELECTOR PLAN 1
Right Total Knee Replacement with Shane   pre-op instructions discussed   labs repeated   CT done 5/4/23 Right Total Knee Replacement with Shane   pre-op instructions discussed   labs repeated   CT done 5/4/23  Retrieve last cardiology notes from 7/11/23 and echo report/ekg  pt reports has not been taking aspirin, will hold preop

## 2023-07-17 NOTE — H&P PST ADULT - OTHER CARE PROVIDERS
cardiologist Dr. Morfin , rheumatologist Wolf Tavares  cardiologist Dr. Morfin , last visit 7/11/23 rheumatologist Wolf Tavares

## 2023-07-17 NOTE — H&P PST ADULT - ASSESSMENT
DASI score: 5.5  DxASI activity: activity level limited by knees pain, able to walk up 2 flights of stairs, walk 15 minutes, housework  Loose teeth or denture: denies     CAPRINI SCORE    AGE RELATED RISK FACTORS                                                       MOBILITY RELATED FACTORS  [ ] Age 41-60 years                                            (1 Point)                  [ ] Bed rest                                                        (1 Point)  [x ] Age: 61-74 years                                           (2 Points)                [ ] Plaster cast                                                   (2 Points)  [ ] Age= 75 years                                              (3 Points)                 [ ] Bed bound for more than 72 hours                   (2 Points)    DISEASE RELATED RISK FACTORS                                               GENDER SPECIFIC FACTORS  [ ] Edema in the lower extremities                       (1 Point)                  [ ] Pregnancy                                                     (1 Point)  [ ] Varicose veins                                               (1 Point)                  [ ] Post-partum < 6 weeks                                   (1 Point)             [x ] BMI > 25 Kg/m2                                            (1 Point)                  [ ] Hormonal therapy  or oral contraception            (1 Point)                 [ ] Sepsis (in the previous month)                        (1 Point)                  [ ] History of pregnancy complications  [ ] Pneumonia or serious lung disease                                               [ ] Unexplained or recurrent                       (1 Point)           (in the previous month)                               (1 Point)  [ ] Abnormal pulmonary function test                     (1 Point)                 SURGERY RELATED RISK FACTORS  [ ] Acute myocardial infarction                              (1 Point)                 [ ]  Section                                            (1 Point)  [x ] Congestive heart failure (in the previous month)  (1 Point)                 [ ] Minor surgery                                                 (1 Point)   [ ] Inflammatory bowel disease                             (1 Point)                 [ ] Arthroscopic surgery                                        (2 Points)  [ ] Central venous access                                    (2 Points)                [ ] General surgery lasting more than 45 minutes   (2 Points)       [ ] Stroke (in the previous month)                          (5 Points)               [ x] Elective arthroplasty                                        (5 Points)                                                                                                                                               HEMATOLOGY RELATED FACTORS                                                 TRAUMA RELATED RISK FACTORS  [ ] Prior episodes of VTE                                     (3 Points)                 [ ] Fracture of the hip, pelvis, or leg                       (5 Points)  [ ] Positive family history for VTE                         (3 Points)                 [ ] Acute spinal cord injury (in the previous month)  (5 Points)  [ ] Prothrombin 35274 A                                      (3 Points)                 [ ] Paralysis  (less than 1 month)                          (5 Points)  [ ] Factor V Leiden                                             (3 Points)                 [ ] Multiple Trauma within 1 month                         (5 Points)  [ ] Lupus anticoagulants                                     (3 Points)                                                           [ ] Anticardiolipin antibodies                                (3 Points)                                                       [ ] High homocysteine in the blood                      (3 Points)                                             [ ] Other congenital or acquired thrombophilia       (3 Points)                                                [ ] Heparin induced thrombocytopenia                  (3 Points)                                          Total Score [    9      ]

## 2023-08-06 ENCOUNTER — TRANSCRIPTION ENCOUNTER (OUTPATIENT)
Age: 63
End: 2023-08-06

## 2023-08-07 ENCOUNTER — INPATIENT (INPATIENT)
Facility: HOSPITAL | Age: 63
LOS: 0 days | Discharge: HOME HEALTH | DRG: 470 | End: 2023-08-08
Attending: ORTHOPAEDIC SURGERY | Admitting: ORTHOPAEDIC SURGERY
Payer: MEDICAID

## 2023-08-07 ENCOUNTER — APPOINTMENT (OUTPATIENT)
Dept: ORTHOPEDIC SURGERY | Facility: HOSPITAL | Age: 63
End: 2023-08-07

## 2023-08-07 ENCOUNTER — TRANSCRIPTION ENCOUNTER (OUTPATIENT)
Age: 63
End: 2023-08-07

## 2023-08-07 VITALS
DIASTOLIC BLOOD PRESSURE: 61 MMHG | RESPIRATION RATE: 18 BRPM | SYSTOLIC BLOOD PRESSURE: 97 MMHG | HEIGHT: 62 IN | TEMPERATURE: 98 F | HEART RATE: 59 BPM | WEIGHT: 177.91 LBS | OXYGEN SATURATION: 96 %

## 2023-08-07 DIAGNOSIS — Z98.890 OTHER SPECIFIED POSTPROCEDURAL STATES: Chronic | ICD-10-CM

## 2023-08-07 DIAGNOSIS — Z98.891 HISTORY OF UTERINE SCAR FROM PREVIOUS SURGERY: Chronic | ICD-10-CM

## 2023-08-07 DIAGNOSIS — S42.309A UNSPECIFIED FRACTURE OF SHAFT OF HUMERUS, UNSPECIFIED ARM, INITIAL ENCOUNTER FOR CLOSED FRACTURE: Chronic | ICD-10-CM

## 2023-08-07 DIAGNOSIS — M17.11 UNILATERAL PRIMARY OSTEOARTHRITIS, RIGHT KNEE: ICD-10-CM

## 2023-08-07 LAB
GLUCOSE BLDC GLUCOMTR-MCNC: 156 MG/DL — HIGH (ref 70–99)
GLUCOSE BLDC GLUCOMTR-MCNC: 177 MG/DL — HIGH (ref 70–99)
GLUCOSE BLDC GLUCOMTR-MCNC: 213 MG/DL — HIGH (ref 70–99)
GLUCOSE BLDC GLUCOMTR-MCNC: 230 MG/DL — HIGH (ref 70–99)
GLUCOSE BLDC GLUCOMTR-MCNC: 257 MG/DL — HIGH (ref 70–99)

## 2023-08-07 PROCEDURE — 27447 TOTAL KNEE ARTHROPLASTY: CPT | Mod: RT

## 2023-08-07 PROCEDURE — S2900 ROBOTIC SURGICAL SYSTEM: CPT | Mod: NC

## 2023-08-07 PROCEDURE — 73560 X-RAY EXAM OF KNEE 1 OR 2: CPT | Mod: 26,RT

## 2023-08-07 PROCEDURE — 0055T BONE SRGRY CMPTR CT/MRI IMAG: CPT

## 2023-08-07 PROCEDURE — 20985 CPTR-ASST DIR MS PX: CPT

## 2023-08-07 DEVICE — TRIATHLON TIBIAL COMP SZ 3: Type: IMPLANTABLE DEVICE | Site: RIGHT | Status: FUNCTIONAL

## 2023-08-07 DEVICE — INSERT TIB BEARING TRIATHLON CS X3 SZ 3 9MM: Type: IMPLANTABLE DEVICE | Site: RIGHT | Status: FUNCTIONAL

## 2023-08-07 DEVICE — MAKO BONE PIN 3.2MM X 110MM: Type: IMPLANTABLE DEVICE | Site: RIGHT | Status: FUNCTIONAL

## 2023-08-07 DEVICE — MAKO BONE PIN 3.2MM X 140MM: Type: IMPLANTABLE DEVICE | Site: RIGHT | Status: FUNCTIONAL

## 2023-08-07 DEVICE — COMP FEM CR CMNTLSS BEADED W/ PA SZ 2 RT: Type: IMPLANTABLE DEVICE | Site: RIGHT | Status: FUNCTIONAL

## 2023-08-07 RX ORDER — CHOLECALCIFEROL (VITAMIN D3) 125 MCG
7000 CAPSULE ORAL
Qty: 0 | Refills: 0 | DISCHARGE

## 2023-08-07 RX ORDER — METFORMIN HYDROCHLORIDE 850 MG/1
500 TABLET ORAL DAILY
Refills: 0 | Status: DISCONTINUED | OUTPATIENT
Start: 2023-08-07 | End: 2023-08-08

## 2023-08-07 RX ORDER — PANTOPRAZOLE SODIUM 20 MG/1
40 TABLET, DELAYED RELEASE ORAL ONCE
Refills: 0 | Status: COMPLETED | OUTPATIENT
Start: 2023-08-07 | End: 2023-08-07

## 2023-08-07 RX ORDER — METOPROLOL TARTRATE 50 MG
100 TABLET ORAL
Refills: 0 | Status: DISCONTINUED | OUTPATIENT
Start: 2023-08-07 | End: 2023-08-07

## 2023-08-07 RX ORDER — HYDROMORPHONE HYDROCHLORIDE 2 MG/ML
0.5 INJECTION INTRAMUSCULAR; INTRAVENOUS; SUBCUTANEOUS
Refills: 0 | Status: DISCONTINUED | OUTPATIENT
Start: 2023-08-07 | End: 2023-08-07

## 2023-08-07 RX ORDER — KETOROLAC TROMETHAMINE 30 MG/ML
15 SYRINGE (ML) INJECTION EVERY 6 HOURS
Refills: 0 | Status: DISCONTINUED | OUTPATIENT
Start: 2023-08-07 | End: 2023-08-08

## 2023-08-07 RX ORDER — BISOPROLOL FUMARATE 10 MG/1
1 TABLET, FILM COATED ORAL
Qty: 0 | Refills: 0 | DISCHARGE

## 2023-08-07 RX ORDER — METHOTREXATE 2.5 MG/1
3 TABLET ORAL
Qty: 0 | Refills: 0 | DISCHARGE

## 2023-08-07 RX ORDER — SACUBITRIL AND VALSARTAN 24; 26 MG/1; MG/1
1 TABLET, FILM COATED ORAL
Refills: 0 | Status: DISCONTINUED | OUTPATIENT
Start: 2023-08-07 | End: 2023-08-08

## 2023-08-07 RX ORDER — ACETAMINOPHEN 500 MG
1000 TABLET ORAL EVERY 8 HOURS
Refills: 0 | Status: DISCONTINUED | OUTPATIENT
Start: 2023-08-07 | End: 2023-08-07

## 2023-08-07 RX ORDER — SODIUM CHLORIDE 9 MG/ML
500 INJECTION, SOLUTION INTRAVENOUS ONCE
Refills: 0 | Status: DISCONTINUED | OUTPATIENT
Start: 2023-08-07 | End: 2023-08-07

## 2023-08-07 RX ORDER — EMPAGLIFLOZIN 10 MG/1
1 TABLET, FILM COATED ORAL
Qty: 0 | Refills: 0 | DISCHARGE

## 2023-08-07 RX ORDER — FOLIC ACID 0.8 MG
1 TABLET ORAL
Qty: 0 | Refills: 0 | DISCHARGE

## 2023-08-07 RX ORDER — ACETAMINOPHEN 500 MG
1000 TABLET ORAL ONCE
Refills: 0 | Status: COMPLETED | OUTPATIENT
Start: 2023-08-08 | End: 2023-08-08

## 2023-08-07 RX ORDER — MAGNESIUM HYDROXIDE 400 MG/1
30 TABLET, CHEWABLE ORAL DAILY
Refills: 0 | Status: DISCONTINUED | OUTPATIENT
Start: 2023-08-07 | End: 2023-08-08

## 2023-08-07 RX ORDER — CELECOXIB 200 MG/1
200 CAPSULE ORAL EVERY 12 HOURS
Refills: 0 | Status: DISCONTINUED | OUTPATIENT
Start: 2023-08-08 | End: 2023-08-08

## 2023-08-07 RX ORDER — DEXTROSE 50 % IN WATER 50 %
15 SYRINGE (ML) INTRAVENOUS ONCE
Refills: 0 | Status: DISCONTINUED | OUTPATIENT
Start: 2023-08-07 | End: 2023-08-08

## 2023-08-07 RX ORDER — SODIUM CHLORIDE 9 MG/ML
1000 INJECTION, SOLUTION INTRAVENOUS
Refills: 0 | Status: DISCONTINUED | OUTPATIENT
Start: 2023-08-07 | End: 2023-08-08

## 2023-08-07 RX ORDER — SENNA PLUS 8.6 MG/1
2 TABLET ORAL AT BEDTIME
Refills: 0 | Status: DISCONTINUED | OUTPATIENT
Start: 2023-08-07 | End: 2023-08-08

## 2023-08-07 RX ORDER — METHOTREXATE 2.5 MG/1
7.5 TABLET ORAL
Refills: 0 | Status: CANCELLED | OUTPATIENT
Start: 2023-08-09 | End: 2023-08-08

## 2023-08-07 RX ORDER — PANTOPRAZOLE SODIUM 20 MG/1
40 TABLET, DELAYED RELEASE ORAL
Refills: 0 | Status: DISCONTINUED | OUTPATIENT
Start: 2023-08-07 | End: 2023-08-08

## 2023-08-07 RX ORDER — ATORVASTATIN CALCIUM 80 MG/1
1 TABLET, FILM COATED ORAL
Qty: 0 | Refills: 0 | DISCHARGE

## 2023-08-07 RX ORDER — SODIUM CHLORIDE 9 MG/ML
3 INJECTION INTRAMUSCULAR; INTRAVENOUS; SUBCUTANEOUS EVERY 8 HOURS
Refills: 0 | Status: DISCONTINUED | OUTPATIENT
Start: 2023-08-07 | End: 2023-08-07

## 2023-08-07 RX ORDER — DEXTROSE 50 % IN WATER 50 %
25 SYRINGE (ML) INTRAVENOUS ONCE
Refills: 0 | Status: DISCONTINUED | OUTPATIENT
Start: 2023-08-07 | End: 2023-08-08

## 2023-08-07 RX ORDER — RABEPRAZOLE 20 MG/1
1 TABLET, DELAYED RELEASE ORAL
Qty: 0 | Refills: 0 | DISCHARGE

## 2023-08-07 RX ORDER — SACUBITRIL AND VALSARTAN 24; 26 MG/1; MG/1
1 TABLET, FILM COATED ORAL
Refills: 0 | DISCHARGE

## 2023-08-07 RX ORDER — METFORMIN HYDROCHLORIDE 850 MG/1
1 TABLET ORAL
Qty: 0 | Refills: 0 | DISCHARGE

## 2023-08-07 RX ORDER — SODIUM CHLORIDE 9 MG/ML
500 INJECTION, SOLUTION INTRAVENOUS ONCE
Refills: 0 | Status: COMPLETED | OUTPATIENT
Start: 2023-08-07 | End: 2023-08-07

## 2023-08-07 RX ORDER — ESCITALOPRAM OXALATE 10 MG/1
1 TABLET, FILM COATED ORAL
Qty: 0 | Refills: 0 | DISCHARGE

## 2023-08-07 RX ORDER — SODIUM CHLORIDE 9 MG/ML
500 INJECTION INTRAMUSCULAR; INTRAVENOUS; SUBCUTANEOUS ONCE
Refills: 0 | Status: DISCONTINUED | OUTPATIENT
Start: 2023-08-07 | End: 2023-08-07

## 2023-08-07 RX ORDER — DEXTROSE 50 % IN WATER 50 %
12.5 SYRINGE (ML) INTRAVENOUS ONCE
Refills: 0 | Status: DISCONTINUED | OUTPATIENT
Start: 2023-08-07 | End: 2023-08-08

## 2023-08-07 RX ORDER — OXYCODONE HYDROCHLORIDE 5 MG/1
5 TABLET ORAL EVERY 4 HOURS
Refills: 0 | Status: DISCONTINUED | OUTPATIENT
Start: 2023-08-07 | End: 2023-08-08

## 2023-08-07 RX ORDER — OXYCODONE HYDROCHLORIDE 5 MG/1
10 TABLET ORAL EVERY 4 HOURS
Refills: 0 | Status: DISCONTINUED | OUTPATIENT
Start: 2023-08-07 | End: 2023-08-08

## 2023-08-07 RX ORDER — POLYETHYLENE GLYCOL 3350 17 G/17G
17 POWDER, FOR SOLUTION ORAL AT BEDTIME
Refills: 0 | Status: DISCONTINUED | OUTPATIENT
Start: 2023-08-07 | End: 2023-08-08

## 2023-08-07 RX ORDER — ONDANSETRON 8 MG/1
4 TABLET, FILM COATED ORAL EVERY 6 HOURS
Refills: 0 | Status: DISCONTINUED | OUTPATIENT
Start: 2023-08-07 | End: 2023-08-08

## 2023-08-07 RX ORDER — ATORVASTATIN CALCIUM 80 MG/1
40 TABLET, FILM COATED ORAL AT BEDTIME
Refills: 0 | Status: DISCONTINUED | OUTPATIENT
Start: 2023-08-07 | End: 2023-08-08

## 2023-08-07 RX ORDER — TRAMADOL HYDROCHLORIDE 50 MG/1
50 TABLET ORAL EVERY 6 HOURS
Refills: 0 | Status: DISCONTINUED | OUTPATIENT
Start: 2023-08-07 | End: 2023-08-08

## 2023-08-07 RX ORDER — TRAMADOL HYDROCHLORIDE 50 MG/1
50 TABLET ORAL ONCE
Refills: 0 | Status: DISCONTINUED | OUTPATIENT
Start: 2023-08-07 | End: 2023-08-07

## 2023-08-07 RX ORDER — INSULIN LISPRO 100/ML
VIAL (ML) SUBCUTANEOUS
Refills: 0 | Status: DISCONTINUED | OUTPATIENT
Start: 2023-08-07 | End: 2023-08-08

## 2023-08-07 RX ORDER — LIDOCAINE HCL 20 MG/ML
0.2 VIAL (ML) INJECTION ONCE
Refills: 0 | Status: DISCONTINUED | OUTPATIENT
Start: 2023-08-07 | End: 2023-08-07

## 2023-08-07 RX ORDER — SPIRONOLACTONE 25 MG/1
1 TABLET, FILM COATED ORAL
Qty: 0 | Refills: 0 | DISCHARGE

## 2023-08-07 RX ORDER — ASPIRIN/CALCIUM CARB/MAGNESIUM 324 MG
325 TABLET ORAL DAILY
Refills: 0 | Status: DISCONTINUED | OUTPATIENT
Start: 2023-08-08 | End: 2023-08-08

## 2023-08-07 RX ORDER — GLUCAGON INJECTION, SOLUTION 0.5 MG/.1ML
1 INJECTION, SOLUTION SUBCUTANEOUS ONCE
Refills: 0 | Status: DISCONTINUED | OUTPATIENT
Start: 2023-08-07 | End: 2023-08-08

## 2023-08-07 RX ORDER — CHLORHEXIDINE GLUCONATE 213 G/1000ML
1 SOLUTION TOPICAL ONCE
Refills: 0 | Status: DISCONTINUED | OUTPATIENT
Start: 2023-08-07 | End: 2023-08-07

## 2023-08-07 RX ORDER — SPIRONOLACTONE 25 MG/1
25 TABLET, FILM COATED ORAL DAILY
Refills: 0 | Status: DISCONTINUED | OUTPATIENT
Start: 2023-08-07 | End: 2023-08-08

## 2023-08-07 RX ORDER — ACETAMINOPHEN 500 MG
1000 TABLET ORAL ONCE
Refills: 0 | Status: COMPLETED | OUTPATIENT
Start: 2023-08-07 | End: 2023-08-07

## 2023-08-07 RX ORDER — ESCITALOPRAM OXALATE 10 MG/1
20 TABLET, FILM COATED ORAL DAILY
Refills: 0 | Status: DISCONTINUED | OUTPATIENT
Start: 2023-08-07 | End: 2023-08-08

## 2023-08-07 RX ORDER — INSULIN LISPRO 100/ML
VIAL (ML) SUBCUTANEOUS AT BEDTIME
Refills: 0 | Status: DISCONTINUED | OUTPATIENT
Start: 2023-08-07 | End: 2023-08-08

## 2023-08-07 RX ORDER — SPIRONOLACTONE 25 MG/1
25 TABLET, FILM COATED ORAL DAILY
Refills: 0 | Status: DISCONTINUED | OUTPATIENT
Start: 2023-08-07 | End: 2023-08-07

## 2023-08-07 RX ORDER — SACUBITRIL AND VALSARTAN 24; 26 MG/1; MG/1
1 TABLET, FILM COATED ORAL
Refills: 0 | Status: DISCONTINUED | OUTPATIENT
Start: 2023-08-07 | End: 2023-08-07

## 2023-08-07 RX ORDER — CEFAZOLIN SODIUM 1 G
2000 VIAL (EA) INJECTION EVERY 8 HOURS
Refills: 0 | Status: COMPLETED | OUTPATIENT
Start: 2023-08-07 | End: 2023-08-08

## 2023-08-07 RX ORDER — ACETAMINOPHEN 500 MG
975 TABLET ORAL EVERY 8 HOURS
Refills: 0 | Status: DISCONTINUED | OUTPATIENT
Start: 2023-08-08 | End: 2023-08-08

## 2023-08-07 RX ORDER — BISOPROLOL FUMARATE 10 MG/1
5 TABLET, FILM COATED ORAL
Refills: 0 | Status: DISCONTINUED | OUTPATIENT
Start: 2023-08-07 | End: 2023-08-08

## 2023-08-07 RX ADMIN — Medication 1000 MILLIGRAM(S): at 21:17

## 2023-08-07 RX ADMIN — SODIUM CHLORIDE 3 MILLILITER(S): 9 INJECTION INTRAMUSCULAR; INTRAVENOUS; SUBCUTANEOUS at 10:56

## 2023-08-07 RX ADMIN — SACUBITRIL AND VALSARTAN 1 TABLET(S): 24; 26 TABLET, FILM COATED ORAL at 18:43

## 2023-08-07 RX ADMIN — OXYCODONE HYDROCHLORIDE 10 MILLIGRAM(S): 5 TABLET ORAL at 21:32

## 2023-08-07 RX ADMIN — Medication 400 MILLIGRAM(S): at 20:47

## 2023-08-07 RX ADMIN — ATORVASTATIN CALCIUM 40 MILLIGRAM(S): 80 TABLET, FILM COATED ORAL at 21:32

## 2023-08-07 RX ADMIN — OXYCODONE HYDROCHLORIDE 10 MILLIGRAM(S): 5 TABLET ORAL at 22:32

## 2023-08-07 RX ADMIN — BISOPROLOL FUMARATE 5 MILLIGRAM(S): 10 TABLET, FILM COATED ORAL at 18:43

## 2023-08-07 RX ADMIN — Medication 1: at 21:32

## 2023-08-07 RX ADMIN — SODIUM CHLORIDE 500 MILLILITER(S): 9 INJECTION, SOLUTION INTRAVENOUS at 14:17

## 2023-08-07 RX ADMIN — TRAMADOL HYDROCHLORIDE 50 MILLIGRAM(S): 50 TABLET ORAL at 10:50

## 2023-08-07 RX ADMIN — Medication 100 MILLIGRAM(S): at 20:47

## 2023-08-07 RX ADMIN — PANTOPRAZOLE SODIUM 40 MILLIGRAM(S): 20 TABLET, DELAYED RELEASE ORAL at 10:49

## 2023-08-07 RX ADMIN — Medication 15 MILLIGRAM(S): at 19:05

## 2023-08-07 RX ADMIN — HYDROMORPHONE HYDROCHLORIDE 0.5 MILLIGRAM(S): 2 INJECTION INTRAMUSCULAR; INTRAVENOUS; SUBCUTANEOUS at 15:37

## 2023-08-07 RX ADMIN — Medication 15 MILLIGRAM(S): at 18:38

## 2023-08-07 RX ADMIN — Medication 2: at 16:05

## 2023-08-07 NOTE — PRE-OP CHECKLIST - TAMPON REMOVED
You can access the [facility] Patient Portal, offered by [facility], by registering with the following website: [email or web address]
n/a

## 2023-08-07 NOTE — PATIENT PROFILE ADULT - FALL HARM RISK - UNIVERSAL INTERVENTIONS
Bed in lowest position, wheels locked, appropriate side rails in place/Call bell, personal items and telephone in reach/Instruct patient to call for assistance before getting out of bed or chair/Non-slip footwear when patient is out of bed/[place] to call system/Physically safe environment - no spills, clutter or unnecessary equipment/Purposeful Proactive Rounding/Room/bathroom lighting operational, light cord in reach

## 2023-08-07 NOTE — PHYSICAL THERAPY INITIAL EVALUATION ADULT - PERTINENT HX OF CURRENT PROBLEM, REHAB EVAL
62 yo female, PMH rheumatoid arthritis (on enbrel), HTN, HLD, T2DM (JnR4u=1+), CHF (on entresto, last echo EF=45% as documented on cardiac evaluation note), pt c/o chronic bilateral knees pain, R>L, progressively getting worse, 6-8/10, constant, achy, sharp at times, aggravated with changing position from sitting to standing and also with movements, failed nonsurgical interventions, evaluated by Dr. Benoit, now presents s/p scheduled right TKR with Shane Robot on 8/7/23.

## 2023-08-07 NOTE — CHART NOTE - NSCHARTNOTEFT_GEN_A_CORE
Patient seen and evaluated in the recovery unit. Resting without complaints. Pt states pain is well tolerated. No Chest Pain, SOB, N/V/D/HA.    T(C): 36 (08-07-23 @ 13:24), Max: 36.6 (08-07-23 @ 09:59)  HR: 84 (08-07-23 @ 15:00) (59 - 84)  BP: 103/51 (08-07-23 @ 15:00) (96/53 - 109/53)  RR: 15 (08-07-23 @ 15:00) (15 - 18)  SpO2: 99% (08-07-23 @ 15:00) (93% - 100%)  Wt(kg): --    Exam:  Alert and Oriented, No Acute Distress.   Card: +S1/S2, RRR  Pulm: CTAB  Laterality: R Knee   Aquacel dressing on R Knee clean, dry and intact.   Sensory: Sensation intact to light touch   Motor: 5/5 (+) PF/DF/EHL/FHL  Calves soft, non-tender   2+ DP/PT pulse  Lower extremities warm and well-perfused, cap refill < 3 sec.     Xray:   IMPRESSION: s/p R total knee arthroplasty  Hardware aligned and symmetrical within surrounding cortex. No signs of periprosthetic changes or fractures. Will review official read when available.    Labs:     A/P: 63yFemale s/p R total knee arthroplasty. VSS. NAD.  -PT/OT- WBAT on RLE/OOB   -IS bedside   -DVT PPx: Aspirin 325 mg BID, SCD, Early OOB and Amb  -GI PPx: Protonix 40 mg   -Pain Control  -Continue Current Tx  -f/u AM labs.   -Dispo planning: PACU to Floor, pending PT/OT eval.     David Perez PA-C  Orthopedic Surgery Team  Team Pager #[phone]

## 2023-08-07 NOTE — PATIENT PROFILE ADULT - NSPROPTRIGHTSUPPORTPERSON_GEN_A_NUR
declines Epidermal Autograft Text: The defect edges were debeveled with a #15 scalpel blade.  Given the location of the defect, shape of the defect and the proximity to free margins an epidermal autograft was deemed most appropriate.  Using a sterile surgical marker, the primary defect shape was transferred to the donor site. The epidermal graft was then harvested.  The skin graft was then placed in the primary defect and oriented appropriately.

## 2023-08-07 NOTE — PHYSICAL THERAPY INITIAL EVALUATION ADULT - ACTIVE RANGE OF MOTION EXAMINATION, REHAB EVAL
R knee 5-90 degrees/bilateral upper extremity Active ROM was WFL (within functional limits)/bilateral  lower extremity Active ROM was WFL (within functional limits)

## 2023-08-07 NOTE — PHYSICAL THERAPY INITIAL EVALUATION ADULT - PLANNED THERAPY INTERVENTIONS, PT EVAL
GOAL: Stair Negotiation Training: Patient will be able to negotiate up & down 5 steps independently with unilateral rail, step to gait pattern, in 2 weeks./balance training/bed mobility training/gait training/strengthening/transfer training

## 2023-08-07 NOTE — PRE-ANESTHESIA EVALUATION ADULT - NSANTHPMHFT_GEN_ALL_CORE
chart and consultant notes reviewed. cad s/p angio last yr, mild dz, no intervention. states et > 1 flight, no cp/sob. ef 45%, on entresti. extensive smoking hx. poorly controlled dm, a1c ~ 8. current fs < 200. mild gerd controlled

## 2023-08-07 NOTE — PHYSICAL THERAPY INITIAL EVALUATION ADULT - ADDITIONAL COMMENTS
Pt states she lives with her son in an apartment with no steps to enter, elevator access inside however she will be staying at a friend's house for 1 week before going home with 1 step to enter through the garage, no steps inside. Pt states her friend will be around to assist all the time. Pt was independent with all ADLs and functional mobility, ambulated without an AD. Pt owns a rolling walker. + Pt states she lives with her son in an apartment with no steps to enter, elevator access inside however she will be staying at a friend's house for 1 week before going home. Her friend's house has 1 step to enter through the garage, no steps inside. Pt states her friend will be around to assist all the time. Pt was independent with all ADLs and functional mobility, ambulated without an AD. Pt owns a rolling walker. +

## 2023-08-08 ENCOUNTER — TRANSCRIPTION ENCOUNTER (OUTPATIENT)
Age: 63
End: 2023-08-08

## 2023-08-08 VITALS — OXYGEN SATURATION: 98 % | SYSTOLIC BLOOD PRESSURE: 113 MMHG | HEART RATE: 57 BPM | DIASTOLIC BLOOD PRESSURE: 58 MMHG

## 2023-08-08 LAB
ANION GAP SERPL CALC-SCNC: 14 MMOL/L — SIGNIFICANT CHANGE UP (ref 5–17)
BUN SERPL-MCNC: 15 MG/DL — SIGNIFICANT CHANGE UP (ref 7–23)
CALCIUM SERPL-MCNC: 8.9 MG/DL — SIGNIFICANT CHANGE UP (ref 8.4–10.5)
CHLORIDE SERPL-SCNC: 101 MMOL/L — SIGNIFICANT CHANGE UP (ref 96–108)
CO2 SERPL-SCNC: 21 MMOL/L — LOW (ref 22–31)
CREAT SERPL-MCNC: 0.61 MG/DL — SIGNIFICANT CHANGE UP (ref 0.5–1.3)
EGFR: 100 ML/MIN/1.73M2 — SIGNIFICANT CHANGE UP
GLUCOSE BLDC GLUCOMTR-MCNC: 160 MG/DL — HIGH (ref 70–99)
GLUCOSE BLDC GLUCOMTR-MCNC: 238 MG/DL — HIGH (ref 70–99)
GLUCOSE SERPL-MCNC: 179 MG/DL — HIGH (ref 70–99)
HCT VFR BLD CALC: 41.2 % — SIGNIFICANT CHANGE UP (ref 34.5–45)
HGB BLD-MCNC: 13.1 G/DL — SIGNIFICANT CHANGE UP (ref 11.5–15.5)
MCHC RBC-ENTMCNC: 27.7 PG — SIGNIFICANT CHANGE UP (ref 27–34)
MCHC RBC-ENTMCNC: 31.8 GM/DL — LOW (ref 32–36)
MCV RBC AUTO: 87.1 FL — SIGNIFICANT CHANGE UP (ref 80–100)
NRBC # BLD: 0 /100 WBCS — SIGNIFICANT CHANGE UP (ref 0–0)
PLATELET # BLD AUTO: 261 K/UL — SIGNIFICANT CHANGE UP (ref 150–400)
POTASSIUM SERPL-MCNC: 4.4 MMOL/L — SIGNIFICANT CHANGE UP (ref 3.5–5.3)
POTASSIUM SERPL-SCNC: 4.4 MMOL/L — SIGNIFICANT CHANGE UP (ref 3.5–5.3)
RBC # BLD: 4.73 M/UL — SIGNIFICANT CHANGE UP (ref 3.8–5.2)
RBC # FLD: 12.3 % — SIGNIFICANT CHANGE UP (ref 10.3–14.5)
SODIUM SERPL-SCNC: 136 MMOL/L — SIGNIFICANT CHANGE UP (ref 135–145)
WBC # BLD: 8.22 K/UL — SIGNIFICANT CHANGE UP (ref 3.8–10.5)
WBC # FLD AUTO: 8.22 K/UL — SIGNIFICANT CHANGE UP (ref 3.8–10.5)

## 2023-08-08 PROCEDURE — 36415 COLL VENOUS BLD VENIPUNCTURE: CPT

## 2023-08-08 PROCEDURE — C1713: CPT

## 2023-08-08 PROCEDURE — S2900: CPT

## 2023-08-08 PROCEDURE — 85027 COMPLETE CBC AUTOMATED: CPT

## 2023-08-08 PROCEDURE — 97530 THERAPEUTIC ACTIVITIES: CPT

## 2023-08-08 PROCEDURE — 80048 BASIC METABOLIC PNL TOTAL CA: CPT

## 2023-08-08 PROCEDURE — 97161 PT EVAL LOW COMPLEX 20 MIN: CPT

## 2023-08-08 PROCEDURE — 73560 X-RAY EXAM OF KNEE 1 OR 2: CPT

## 2023-08-08 PROCEDURE — 97165 OT EVAL LOW COMPLEX 30 MIN: CPT

## 2023-08-08 PROCEDURE — C1776: CPT

## 2023-08-08 PROCEDURE — 97116 GAIT TRAINING THERAPY: CPT

## 2023-08-08 PROCEDURE — 82962 GLUCOSE BLOOD TEST: CPT

## 2023-08-08 RX ORDER — TRAMADOL HYDROCHLORIDE 50 MG/1
1 TABLET ORAL
Qty: 0 | Refills: 0 | DISCHARGE
Start: 2023-08-08

## 2023-08-08 RX ORDER — NALOXONE HYDROCHLORIDE 4 MG/.1ML
1 SPRAY NASAL
Qty: 1 | Refills: 0
Start: 2023-08-08

## 2023-08-08 RX ORDER — ETANERCEPT 25 MG
50 VIAL (EA) SUBCUTANEOUS
Qty: 0 | Refills: 0 | DISCHARGE

## 2023-08-08 RX ORDER — ASPIRIN/CALCIUM CARB/MAGNESIUM 324 MG
1 TABLET ORAL
Qty: 0 | Refills: 0 | DISCHARGE

## 2023-08-08 RX ORDER — ACETAMINOPHEN 500 MG
3 TABLET ORAL
Qty: 0 | Refills: 0 | DISCHARGE
Start: 2023-08-08

## 2023-08-08 RX ORDER — OXYCODONE HYDROCHLORIDE 5 MG/1
0.5 TABLET ORAL
Qty: 15 | Refills: 0
Start: 2023-08-08

## 2023-08-08 RX ORDER — CELECOXIB 200 MG/1
1 CAPSULE ORAL
Qty: 0 | Refills: 0 | DISCHARGE
Start: 2023-08-08

## 2023-08-08 RX ORDER — TRAMADOL HYDROCHLORIDE 50 MG/1
1 TABLET ORAL
Qty: 28 | Refills: 0
Start: 2023-08-08

## 2023-08-08 RX ORDER — ASPIRIN/CALCIUM CARB/MAGNESIUM 324 MG
1 TABLET ORAL
Qty: 84 | Refills: 0
Start: 2023-08-08 | End: 2023-09-18

## 2023-08-08 RX ORDER — OXYCODONE HYDROCHLORIDE 5 MG/1
1 TABLET ORAL
Qty: 0 | Refills: 0 | DISCHARGE
Start: 2023-08-08

## 2023-08-08 RX ORDER — SENNA PLUS 8.6 MG/1
2 TABLET ORAL
Qty: 0 | Refills: 0 | DISCHARGE
Start: 2023-08-08

## 2023-08-08 RX ADMIN — SPIRONOLACTONE 25 MILLIGRAM(S): 25 TABLET, FILM COATED ORAL at 05:09

## 2023-08-08 RX ADMIN — Medication 1000 MILLIGRAM(S): at 04:47

## 2023-08-08 RX ADMIN — Medication 400 MILLIGRAM(S): at 11:02

## 2023-08-08 RX ADMIN — SACUBITRIL AND VALSARTAN 1 TABLET(S): 24; 26 TABLET, FILM COATED ORAL at 05:09

## 2023-08-08 RX ADMIN — METFORMIN HYDROCHLORIDE 500 MILLIGRAM(S): 850 TABLET ORAL at 11:40

## 2023-08-08 RX ADMIN — Medication 15 MILLIGRAM(S): at 00:45

## 2023-08-08 RX ADMIN — Medication 15 MILLIGRAM(S): at 05:39

## 2023-08-08 RX ADMIN — Medication 325 MILLIGRAM(S): at 11:03

## 2023-08-08 RX ADMIN — Medication 100 MILLIGRAM(S): at 04:16

## 2023-08-08 RX ADMIN — Medication 400 MILLIGRAM(S): at 04:17

## 2023-08-08 RX ADMIN — BISOPROLOL FUMARATE 5 MILLIGRAM(S): 10 TABLET, FILM COATED ORAL at 05:08

## 2023-08-08 RX ADMIN — Medication 1: at 08:22

## 2023-08-08 RX ADMIN — Medication 15 MILLIGRAM(S): at 05:09

## 2023-08-08 RX ADMIN — Medication 1000 MILLIGRAM(S): at 11:30

## 2023-08-08 RX ADMIN — ESCITALOPRAM OXALATE 20 MILLIGRAM(S): 10 TABLET, FILM COATED ORAL at 11:02

## 2023-08-08 RX ADMIN — PANTOPRAZOLE SODIUM 40 MILLIGRAM(S): 20 TABLET, DELAYED RELEASE ORAL at 05:09

## 2023-08-08 RX ADMIN — Medication 15 MILLIGRAM(S): at 00:15

## 2023-08-08 NOTE — DISCHARGE NOTE PROVIDER - HOSPITAL COURSE
History of Present Illness:  64 yo very pleasant female. PMH rheumatoid arthritis (on enbrel), HTN, HLD, T2DM (PiX8n=8+), CHF (on entresto, last echo EF=45% as documented on cardiac evaluation note), pt c/o chronic bilateral knees pain, R>L, progressively getting worse, 6-8/10, constant, achy, sharp at times, aggravated with changing position from sitting to standing and also with movements, failed nonsurgical interventions, evaluated by Dr. Benoit, now presents to [facility] scheduled for right TKR with Shane Robot on 23    Goals of Care: "to be able to walk without pain"    PAST MEDICAL HISTORY:  Cardiomyopathy, nonischemic   GERD (gastroesophageal reflux disease)   H/O eczema   History of heart failure diagnosed with 3/2022  Rheumatoid arthritis   Type 2 diabetes mellitus.     PAST SURGICAL HISTORY:  Closed fracture of humerus   H/O arthroscopy of left knee   S/P  section   S/P excision of lipoma back left forearm  S/P lumpectomy, left breast benign  Status post de Quervain's release surgery LUE.    Hospital Course:  After admission on 23 and receiving pre-operative parenteral prophylactic antibiotics, the patient underwent an uncomplicated Left total knee replacement with SHANE robotic assist with Dr. Benoit. Patient tolerated the procedure well and was transferred to the recovery room in stable condition, with a stable neuro / vascular exam of the operated extremity.    Patient was placed on ecotrin 325 BID x 6 weeks for DVT ppx, and was placed on Protonix for GI protection.   Patient was made weight bearing as tolerated with the operative leg.     Patient evaluated by PT/OT and recommended for disposition for home with home PT.     Discharge and Orthopedic Care instructions were delineated in the Discharge Plan and reviewed with the patient. All medications were delineated in the medication reconciliation tool and key points were reviewed with the patient. They were deemed stable from an Orthopedic & medical standpoint for discharge ***

## 2023-08-08 NOTE — PROGRESS NOTE ADULT - SUBJECTIVE AND OBJECTIVE BOX
SURGERY      Pt seen and examined. Pt denies any overnight events. Pt reports pain is well controlled. Pt denies any fever/chills/nausea/vomiting. Pt reports ambulating with to bathroom. Worked with PT yesterday.    ICU Vital Signs Last 24 Hrs  T(C): 36.4 (08 Aug 2023 04:18), Max: 36.8 (07 Aug 2023 19:40)  T(F): 97.5 (08 Aug 2023 04:18), Max: 98.3 (07 Aug 2023 19:40)  HR: 67 (08 Aug 2023 04:18) (59 - 85)  BP: 136/77 (08 Aug 2023 04:18) (96/53 - 146/71)  BP(mean): 71 (07 Aug 2023 16:30) (63 - 76)  ABP: --  ABP(mean): --  RR: 18 (08 Aug 2023 04:18) (15 - 18)  SpO2: 95% (08 Aug 2023 04:18) (93% - 100%)      I&O's Detail    07 Aug 2023 07:01  -  08 Aug 2023 06:12  --------------------------------------------------------  IN:    Oral Fluid: 790 mL  Total IN: 790 mL    OUT:    Voided (mL): 800 mL  Total OUT: 800 mL    Total NET: -10 mL      Physical Exam  Gen: NAD, alert and oriented  Resp: Unlabored breathing  RLE: Skin intact, no ecchymosis,   aquacel intact, clean, dry       SILT DP/SP/ Heidi/Saph/tib       +EHL/FHL/TA/Gastroc,        DP+,        soft compartments, no calf ttp            LABS:                  RADIOLOGY & ADDITIONAL STUDIES:      Assessment/Plan:  63 year old female POD1 r TKA who is doing well. Walked with PT and ambulating to bathroom.    -multimodal analgesia  -incentive spirometry  -DVT ppx  -PT/OT   -OOB with assistance   -dispo: home per PT         Contact Ortho: 1409

## 2023-08-08 NOTE — DISCHARGE NOTE PROVIDER - CARE PROVIDER_API CALL
Alfonzo Benoit  Orthopaedic Surgery  [address], [address]  [place] [address]  Phone: [phone]  Fax: [phone]  Established Patient  Follow Up Time: 2 weeks

## 2023-08-08 NOTE — OCCUPATIONAL THERAPY INITIAL EVALUATION ADULT - LIVES WITH, PROFILE
Pt states she lives with son in apartment, 0 steps to enter +elevator access, tub in bathroom. Pt states she will be staying with friend for a week post discharge, lives in private house with 1 step to enter, 1st floor setup, tub in bathroom. Pt I in ADLs and ambulation prior to admission

## 2023-08-08 NOTE — DISCHARGE NOTE PROVIDER - NSDCFUADDINST_GEN_ALL_CORE_FT
Please call to schedule your post-operative follow up appointment with Dr. Benoit for 2 weeks after hospital discharge.   Keep dressing clean, dry, and intact. Have doctor remove bandage at your post-operative follow up appointment.   Shower: with assistance beginning on POD #5. keep the dressing away from the stream of water. Pat the dressing dry when coming out of the shower. no tub baths.   Continue to ambulate as tolerated to the operative leg with a rolling walker.   Continue to take Ecotrin (Aspirin) 325 mg twice daily x 6 weeks to help prevent blood clots. Please continue taking Protonix 40mg daily while taking aspirin for gastrointestinal protection.   Recommended to follow up with your primary care provider within 1-2 months of hospital discharge to discuss your recent surgery and any change to your medications.    Please call to schedule your post-operative follow up appointment with Dr. Benoit for 2 weeks after hospital discharge.   Keep dressing clean, dry, and intact. Have doctor remove bandage at your post-operative follow up appointment.   Shower: with assistance beginning on POD #5. keep the dressing away from the stream of water. Pat the dressing dry when coming out of the shower. no tub baths.   Continue to ambulate as tolerated to the operative leg with a rolling walker.   Continue to take Ecotrin (Aspirin) 325 mg twice daily x 6 weeks to help prevent blood clots. Please continue taking Protonix 40mg daily while taking aspirin for gastrointestinal protection.   Recommended to follow up with your primary care provider within 1-2 months of hospital discharge to discuss your recent surgery and any change to your medications.     Do not restart Enbrel until cleared by Dr. Benoit.

## 2023-08-08 NOTE — DISCHARGE NOTE PROVIDER - NSDCCPCAREPLAN_GEN_ALL_CORE_FT
PRINCIPAL DISCHARGE DIAGNOSIS  Diagnosis: Primary localized osteoarthritis of right knee  Assessment and Plan of Treatment:

## 2023-08-08 NOTE — DISCHARGE NOTE PROVIDER - NSDCCPTREATMENT_GEN_ALL_CORE_FT
PRINCIPAL PROCEDURE  Procedure: Right total knee arthroplasty  Findings and Treatment: with KARISHMA robotic assist

## 2023-08-08 NOTE — DISCHARGE NOTE NURSING/CASE MANAGEMENT/SOCIAL WORK - NSDCVIVACCINE_GEN_ALL_CORE_FT
rabies, intradermal injection; 21-Jun-2019 19:47; Edwina Rg (RN); [employer]; [id number] (Exp. Date: 21-Jul-2022); IntraMuscular; Dorsogluteal Right.; 1 milliLiter(s); VIS (VIS Published: 21-Jul-2022, VIS Presented: 21-Jun-2019);   rabies, intradermal injection; 24-Jun-2019 17:42; Kaylen Vivas (RN); [employer]; [id number] (Exp. Date: 31-Jul-2022); IntraMuscular; Deltoid Left.; 1 milliLiter(s); VIS (VIS Published: 24-Jun-2019, VIS Presented: 24-Jun-2019);   rabies, intradermal injection; 28-Jun-2019 09:29; Lynette Garcia (RN); [employer]; [id number] (Exp. Date: 21-Nov-2021); IntraMuscular; Deltoid Left.; 1 milliLiter(s); VIS (VIS Published: 28-Jul-2022, VIS Presented: 28-Jun-2019);   rabies, intradermal injection; 05-Jul-2019 20:56; Brandon Rutledge (RN); [employer]; [id number] (Exp. Date: 05-Jul-2022); IntraMuscular; Deltoid Left.; 1 milliLiter(s); VIS (VIS Published: 05-Jul-2019, VIS Presented: 05-Jul-2019);   Tdap; 21-Jun-2019 19:45; Edwina Rg (RN); [employer]; [id number] (Exp. Date: 04-Apr-2021); IntraMuscular; Deltoid Right.; 0.5 milliLiter(s); VIS (VIS Published: 09-May-2013, VIS Presented: 21-Jun-2019);

## 2023-08-08 NOTE — DISCHARGE NOTE PROVIDER - NSDCMRMEDTOKEN_GEN_ALL_CORE_FT
acetaminophen 325 mg oral tablet: 3 tab(s) orally every 8 hours  AcipHex 20 mg oral delayed release tablet: 1 tab(s) orally once a day  atorvastatin 40 mg oral tablet: 1 tab(s) orally once a day (at bedtime)  bisoprolol 5 mg oral tablet: 1 tab(s) orally 2 times a day  celecoxib 200 mg oral capsule: 1 cap(s) orally every 12 hours  Ecotrin 325 mg oral delayed release tablet: 1 tab(s) orally 2 times a day x 6 weeks post op for DVT ppx  Enbrel 25 mg subcutaneous kit: 50 milligram(s) subcutaneous on Sunday  Entresto 24 mg-26 mg oral tablet: 1 tab(s) orally 2 times a day  folic acid 1 mg oral tablet: 1 tab(s) orally every 7 days  Jardiance 10 mg oral tablet: 1 tab(s) orally once a day (in the morning)  Lexapro 20 mg oral tablet: 1 tab(s) orally once a day  metFORMIN 500 mg oral tablet: 1 tab(s) orally once a day  methotrexate 2.5 mg oral tablet: 3 tab(s) orally every 7 days Wednesday  oxyCODONE 5 mg oral tablet: 1 tab(s) orally every 4 hours As needed Moderate Pain (4 - 6)  senna leaf extract oral tablet: 2 tab(s) orally once a day (at bedtime)  spironolactone 25 mg oral tablet: 1 tab(s) orally once a day  traMADol 50 mg oral tablet: 1 tab(s) orally every 6 hours As needed Mild Pain (1 - 3)  Vitamin D3: 7000 unit(s) orally 2 times a week   acetaminophen 325 mg oral tablet: 3 tab(s) orally every 8 hours ATC x 5 days then PRN for mild pain, temp &gt;100.4F  AcipHex 20 mg oral delayed release tablet: 1 tab(s) orally once a day  atorvastatin 40 mg oral tablet: 1 tab(s) orally once a day (at bedtime)  bisoprolol 5 mg oral tablet: 1 tab(s) orally 2 times a day  Ecotrin 325 mg oral delayed release tablet: 1 tab(s) orally 2 times a day x 6 weeks post op for DVT ppx  Entresto 24 mg-26 mg oral tablet: 1 tab(s) orally 2 times a day  folic acid 1 mg oral tablet: 1 tab(s) orally every 7 days  Jardiance 10 mg oral tablet: 1 tab(s) orally once a day (in the morning)  Lexapro 20 mg oral tablet: 1 tab(s) orally once a day  metFORMIN 500 mg oral tablet: 1 tab(s) orally once a day  methotrexate 2.5 mg oral tablet: 3 tab(s) orally every 7 days Wednesday  naloxone 4 mg/0.1 mL nasal spray: 1 spray(s) intranasally every 2 to 3 minutes as needed for Sedation alternate between nostrils every 2-3 minutes  naproxen 500 mg oral tablet: 1 tab(s) orally 2 times a day as needed for pain  oxyCODONE 5 mg oral tablet: 0.5 tab(s) orally every 4 hours as needed for Moderate Pain (4 - 6) or 1 tab PO q 4 hrs PRN severe pain MDD: 4  senna leaf extract oral tablet: 2 tab(s) orally once a day (at bedtime)  spironolactone 25 mg oral tablet: 1 tab(s) orally once a day  traMADol 50 mg oral tablet: 1 tab(s) orally every 6 hours as needed for Mild Pain (1 - 3) MDD: 4  Vitamin D3: 7000 unit(s) orally 2 times a week

## 2023-08-08 NOTE — OCCUPATIONAL THERAPY INITIAL EVALUATION ADULT - PERTINENT HX OF CURRENT PROBLEM, REHAB EVAL
62 yo female, PMH rheumatoid arthritis (on enbrel), HTN, HLD, T2DM (TnO8x=7+), CHF (on entresto, last echo EF=45% as documented on cardiac evaluation note), pt c/o chronic bilateral knees pain, R>L, progressively getting worse, 6-8/10, constant, achy, sharp at times, aggravated with changing position from sitting to standing and also with movements, failed nonsurgical interventions, evaluated by Dr. Benoit, now presents s/p scheduled right TKR with Shane Robot on 8/7/23. Pt s/p R TKA

## 2023-08-23 ENCOUNTER — APPOINTMENT (OUTPATIENT)
Dept: ORTHOPEDIC SURGERY | Facility: CLINIC | Age: 63
End: 2023-08-23
Payer: MEDICAID

## 2023-08-23 VITALS — WEIGHT: 180 LBS | BODY MASS INDEX: 33.13 KG/M2 | HEIGHT: 62 IN

## 2023-08-23 PROCEDURE — 73562 X-RAY EXAM OF KNEE 3: CPT | Mod: RT

## 2023-08-23 PROCEDURE — 99024 POSTOP FOLLOW-UP VISIT: CPT

## 2023-08-23 RX ORDER — TRAMADOL HYDROCHLORIDE 50 MG/1
50 TABLET, COATED ORAL EVERY 8 HOURS
Qty: 20 | Refills: 0 | Status: ACTIVE | COMMUNITY
Start: 2023-08-23 | End: 1900-01-01

## 2023-09-15 NOTE — ED ADULT NURSE NOTE - CADM POA URETHRAL CATHETER
Nasal/sinus endoscopy    Date/Time: 9/15/2023 9:15 AM    Performed by: Beto Farfan PA-C  Authorized by: Beto Farfan PA-C    Consent Done?:  Yes (Verbal)  Anesthesia:     Local anesthetic:  Lidocaine 4% and Angel-Synephrine 1/2%    Location: bilateral nares.    Type of Endoscope:  Flexible (disposable ambu scope)    Patient tolerance:  Patient tolerated the procedure well with no immediate complications  Nose:     Procedure Performed:  Nasal Endoscopy  External:      No external nasal deformity  Intranasal:      Mucosa no polyps     Mucosa ulcers not present     No mucosa lesions present     Turbinates not enlarged     Septum gross deformity  Nasopharynx:      No mucosa lesions     Posterior choanae patent     Eustachian tube patent    
No
The patient is a 23y Male complaining of lacerations.

## 2023-11-02 ENCOUNTER — APPOINTMENT (OUTPATIENT)
Dept: ORTHOPEDIC SURGERY | Facility: CLINIC | Age: 63
End: 2023-11-02
Payer: MEDICAID

## 2023-11-02 VITALS — BODY MASS INDEX: 33.31 KG/M2 | HEIGHT: 62 IN | WEIGHT: 181 LBS

## 2023-11-02 DIAGNOSIS — Z96.651 PRESENCE OF RIGHT ARTIFICIAL KNEE JOINT: ICD-10-CM

## 2023-11-02 PROCEDURE — 73562 X-RAY EXAM OF KNEE 3: CPT | Mod: RT

## 2023-11-02 PROCEDURE — 99024 POSTOP FOLLOW-UP VISIT: CPT

## 2023-11-09 PROBLEM — Z96.651 STATUS POST TOTAL KNEE REPLACEMENT USING CEMENT, RIGHT: Status: ACTIVE | Noted: 2023-08-23

## 2023-12-19 ENCOUNTER — APPOINTMENT (OUTPATIENT)
Dept: OBGYN | Facility: CLINIC | Age: 63
End: 2023-12-19
Payer: MEDICAID

## 2023-12-19 PROCEDURE — 81002 URINALYSIS NONAUTO W/O SCOPE: CPT

## 2023-12-19 PROCEDURE — 99396 PREV VISIT EST AGE 40-64: CPT | Mod: 25

## 2024-01-22 NOTE — ED PROVIDER NOTE - SKIN [+], MLM
Problem: Respiratory - Adult  Goal: Achieves optimal ventilation and oxygenation  Outcome: Progressing   BRONCHOSPASM/BRONCHOCONSTRICTION     [x]         IMPROVE AERATION/BREATH SOUNDS  [x]   ADMINISTER BRONCHODILATOR THERAPY AS APPROPRIATE  [x]   ASSESS BREATH SOUNDS  [x]   IMPLEMENT AEROSOL/MDI PROTOCOL  [x]   PATIENT EDUCATION AS NEEDED     cAT bite tip of right fourth finger

## 2024-04-21 NOTE — ED ADULT NURSE NOTE - CAS DISCH CONDITION
Pt from [facility] assisted living complaining of worsening SOB since this morning.  Baseline 4L at home.  Squad put on NRB and gave 2x duoneb has been 96%+    Stable

## 2024-05-20 ENCOUNTER — APPOINTMENT (OUTPATIENT)
Dept: ORTHOPEDIC SURGERY | Facility: CLINIC | Age: 64
End: 2024-05-20
Payer: COMMERCIAL

## 2024-05-20 VITALS
HEIGHT: 62 IN | WEIGHT: 180 LBS | DIASTOLIC BLOOD PRESSURE: 77 MMHG | BODY MASS INDEX: 33.13 KG/M2 | HEART RATE: 56 BPM | SYSTOLIC BLOOD PRESSURE: 138 MMHG

## 2024-05-20 DIAGNOSIS — M54.2 CERVICALGIA: ICD-10-CM

## 2024-05-20 PROCEDURE — 72040 X-RAY EXAM NECK SPINE 2-3 VW: CPT

## 2024-05-20 PROCEDURE — 99214 OFFICE O/P EST MOD 30 MIN: CPT

## 2024-05-20 NOTE — ADDENDUM
[FreeTextEntry1] : This note was written by Maxwell Gilmore on 05/20/2024 acting as scribe for Dr. Josesito Charlton M.D.  I, Josesito Charlton MD, have read and attest that all the information, medical decision making and discharge instructions within are true and accurate.

## 2024-05-20 NOTE — PHYSICAL EXAM
[Normal] : Gait: normal [Rodríguez's Sign] : negative Rodríguez's sign [Pronator Drift] : negative pronator drift [SLR] : negative straight leg raise [de-identified] : 5 out of 5 motor strength, sensation is intact and symmetrical full range of motion flexion extension and rotation, no palpatory tenderness full range of motion of hips knees shoulders and elbows (all four extremities), no atrophy, negative straight leg raise, no pathological reflexes, no swelling, normal ambulation, no apparent distress skin is intact, no palpable lymph nodes, no upper or lower extremity instability, alert and oriented x3 and normal mood. Normal finger-to nose test.  No upper motor neuron findings. 45 degree bilateral cervical rotation. [de-identified] : XR AP Lat Cervical 05/20/2024 -adequate- reviewed with patient.

## 2024-05-20 NOTE — HISTORY OF PRESENT ILLNESS
[de-identified] : 64 year old female presents for initial evaluation of neck and upper back pain since last week. RHD.  Denies injury. She states that the pain originally originated at the neck then radiated to the upper back, and is now radiating down the LUE to the fingers, with numbness/tingling. States that she has poor  strength of the left hand. Denies dropping items.  Takes tylenol for the pain. She also went to an urgent care last week and was prescribed robaxin.  Can not take NSAIDs due to CHF. Has not tried PT or chiropractic care.  Denies MAURO. PMHx: CHF, RA on embrel and methotrexate, DM, S/P right knee replacement with Dr. Benoit in August 2023.  She works as a .  No fever, chills, sweats, nausea/vomiting. No bowel or bladder dysfunction, no recent weight loss or gain. No night pain. This history is in addition to the intake form that I personally reviewed. [Stable] : stable

## 2024-05-20 NOTE — DISCUSSION/SUMMARY
[de-identified] : Cervicalgia. Left shoulder and elbow pain. CHF no NSAIDs. Discussed all options. Tizanidine PRN. Referral for physical therapy. Will see Dr. May for left shoulder and elbow pain. If no better in 2-3 weeks, will obtain MRI. All options discussed including rest, medicine, home exercise, acupuncture, Chiropractic care, Physical Therapy, Pain management, and last resort surgery. All questions were answered, all alternatives discussed, and the patient is in complete agreement with the treatment plan which the patient contributed to and discussed with me through the shared decision-making process. Follow-up appointment as instructed. Any issues and the patient will call or come in sooner.

## 2024-05-21 ENCOUNTER — APPOINTMENT (OUTPATIENT)
Dept: ORTHOPEDIC SURGERY | Facility: CLINIC | Age: 64
End: 2024-05-21
Payer: COMMERCIAL

## 2024-05-21 VITALS
SYSTOLIC BLOOD PRESSURE: 131 MMHG | BODY MASS INDEX: 33.13 KG/M2 | WEIGHT: 180 LBS | HEIGHT: 62 IN | DIASTOLIC BLOOD PRESSURE: 77 MMHG

## 2024-05-21 DIAGNOSIS — M77.12 LATERAL EPICONDYLITIS, LEFT ELBOW: ICD-10-CM

## 2024-05-21 PROCEDURE — 99203 OFFICE O/P NEW LOW 30 MIN: CPT

## 2024-05-21 PROCEDURE — 73080 X-RAY EXAM OF ELBOW: CPT | Mod: LT

## 2024-05-21 NOTE — ADDENDUM
[FreeTextEntry1] : This note was written by Ese Myles on 05/21/2024 acting solely as a scribe for Dr. Patrick May.  All medical record entries made by the Scribe were at my, Dr. Patrick May, direction and personally dictated by me on 05/21/2024. I have personally reviewed the chart and agree that the record accurately reflects my personal performance of the history, physical exam, assessment and plan.

## 2024-05-21 NOTE — HISTORY OF PRESENT ILLNESS
[de-identified] : 64 year old female presents today with left elbow pain x 1 week. No injury reported. The pain is intermittent brought on with lifting and carrying. Pain radiates to the hand. Takes Tylenol for pain. Unable to take NSAIDs due to heart condition. Compression does not provide relief. She was seen by Dr. Charlton for neck pain.   The patient's past medical history, past surgical history, medications and allergies were reviewed by me today with the patient and documented accordingly. In addition, the patient's family and social history, which were noncontributory to this visit were reviewed also.

## 2024-05-21 NOTE — DISCUSSION/SUMMARY
[de-identified] : 64 year old female with left lateral epicondylitis.   I discussed with the patient the treatment of lateral epicondylitis. I discussed that this is a degenerative condition rather than an inflammatory condition and that the process tends to be reversible (albeit can last from 6-24mos if __ untreated). The best source of treatment is to reduce the offending repetitive overuse injury process and I counseled the patient on how to do this. First line treatment can include watchful waiting for a period of 6-8 wks with specific activity modification and OTC Tylenol. Further treatment includes the use of counterforce bracing, physical therapy for stretching and strengthening, and the use of injection therapy (steroids/PRP etc). I also discussed the potential role of surgical intervention for chronic symptoms that persist greater than 6-12 months. At this time as treatment I recommended a period of relative rest, stretching and strengthening modalities as indicated in a patient handout provided as well as counterforce bracing. If no improvement over the next few months, additional treatment such as corticosteroid injection versus formal physical therapy can be performed. We will see the patient back as needed.  At this time as treatment I recommended a period of relative rest, stretching and strengthening modalities as indicated in a patient handout provided as well as counterforce bracing. If no improvement over the next few months, additional treatment such as corticosteroid injection versus formal physical therapy can be performed.   We will see the patient back as needed.

## 2024-05-21 NOTE — PHYSICAL EXAM
[de-identified] : Left elbow exam  Skin: Clean, dry, intact. No ecchymosis. No swelling. No palpable joint effusion. ROM: Full extension/flexion, full supination/pronation.   Painful ROM: Lateral elbow pain with resisted wrist extension Tenderness: No medial epicondyle pain. Positive lateral epicondyle pain (ECRB). No olecranon pain. No pain at radial head. No pain to radial tunnel. Strength: 5/5 elbow flexion, 5/5 elbow extension, 5/5 supination, 5/5 pronation Stability: Stable to vaus/valgus stress Vasc: 2+ radial pulse, <2s cap refill Sensation: In tact to light touch throughout Neuro: Negative tinels at ulnar canal, AIN/PIN/Ulnar nerve in tact to motor/sensation. [de-identified] : The following radiographs were ordered and read by me during this patients visit. I reviewed each radiograph in detail with the patient and discussed the findings as highlighted below.   3 views of the left elbow were obtained today, 05/21/2024, that show no acute fracture or dislocation. There is no degenerative change seen. There is no gross malalignment. No significant other obvious osseous abnormality, otherwise unremarkable.

## 2024-06-03 ENCOUNTER — APPOINTMENT (OUTPATIENT)
Dept: ORTHOPEDIC SURGERY | Facility: CLINIC | Age: 64
End: 2024-06-03
Payer: COMMERCIAL

## 2024-06-03 VITALS
WEIGHT: 180 LBS | DIASTOLIC BLOOD PRESSURE: 73 MMHG | HEART RATE: 60 BPM | HEIGHT: 62 IN | BODY MASS INDEX: 33.13 KG/M2 | SYSTOLIC BLOOD PRESSURE: 126 MMHG

## 2024-06-03 DIAGNOSIS — M47.812 SPONDYLOSIS W/OUT MYELOPATHY OR RADICULOPATHY, CERVICAL REGION: ICD-10-CM

## 2024-06-03 PROCEDURE — 99214 OFFICE O/P EST MOD 30 MIN: CPT

## 2024-06-03 NOTE — DISCUSSION/SUMMARY
[de-identified] : Cervicalgia. Left shoulder and elbow pain. Feeling much better. CHF no NSAIDs. Discussed all options. Continue HEP from PT. F/U PRN. All options discussed including rest, medicine, home exercise, acupuncture, Chiropractic care, Physical Therapy, Pain management, and last resort surgery. All questions were answered, all alternatives discussed, and the patient is in complete agreement with the treatment plan which the patient contributed to and discussed with me through the shared decision-making process. Follow-up appointment as instructed. Any issues and the patient will call or come in sooner.

## 2024-06-03 NOTE — ADDENDUM
[FreeTextEntry1] : This note was written by Maxwell Gilmore on 06/03/2024 acting as scribe for Dr. Josesito Charlton M.D.  I, Josesito Charlton MD, have read and attest that all the information, medical decision making and discharge instructions within are true and accurate.

## 2024-06-03 NOTE — PHYSICAL EXAM
[Normal] : Gait: normal [Rodríguez's Sign] : negative Rodríguez's sign [Pronator Drift] : negative pronator drift [SLR] : negative straight leg raise [de-identified] : 5 out of 5 motor strength, sensation is intact and symmetrical full range of motion flexion extension and rotation, no palpatory tenderness full range of motion of hips knees shoulders and elbows (all four extremities), no atrophy, negative straight leg raise, no pathological reflexes, no swelling, normal ambulation, no apparent distress skin is intact, no palpable lymph nodes, no upper or lower extremity instability, alert and oriented x3 and normal mood. Normal finger-to nose test.  No upper motor neuron findings. 60 degree bilateral cervical rotation. [de-identified] : XR AP Lat Cervical 05/20/2024 -adequate- reviewed with patient.

## 2024-06-03 NOTE — HISTORY OF PRESENT ILLNESS
[de-identified] : 64 year old female presents for re-evaluation of neck and upper back pain since last week. RHD.  Denies injury. States that she is feeling much better than about 2 weeks ago when she was here last due to PT, Ice, Tylenol and Muscle Relaxer. She states that the pain originally originated at the neck then radiated to the upper back, and is now radiating down the LUE to the fingers, with numbness/tingling. States that she has poor  strength of the left hand, but she states that this has also improved. Denies dropping items.  Takes tylenol for the pain. She also went to an urgent care last week and was prescribed robaxin.  Can not take NSAIDs due to CHF. Has not tried PT or chiropractic care.  Denies MAURO. PMHx: CHF, RA on embrel and methotrexate, DM, S/P right knee replacement with Dr. Benoit in August 2023.  She works as a .  No fever, chills, sweats, nausea/vomiting. No bowel or bladder dysfunction, no recent weight loss or gain. No night pain. This history is in addition to the intake form that I personally reviewed. [Improving] : improving

## 2024-06-17 ENCOUNTER — RX RENEWAL (OUTPATIENT)
Age: 64
End: 2024-06-17

## 2024-06-17 RX ORDER — TIZANIDINE 4 MG/1
4 TABLET ORAL
Qty: 30 | Refills: 0 | Status: ACTIVE | COMMUNITY
Start: 2024-05-20 | End: 1900-01-01

## 2024-07-18 ENCOUNTER — NON-APPOINTMENT (OUTPATIENT)
Age: 64
End: 2024-07-18

## 2024-08-15 ENCOUNTER — RX RENEWAL (OUTPATIENT)
Age: 64
End: 2024-08-15

## 2024-09-12 ENCOUNTER — APPOINTMENT (OUTPATIENT)
Dept: ORTHOPEDIC SURGERY | Facility: CLINIC | Age: 64
End: 2024-09-12
Payer: COMMERCIAL

## 2024-09-12 VITALS — HEIGHT: 62 IN | BODY MASS INDEX: 33.13 KG/M2 | WEIGHT: 180 LBS

## 2024-09-12 DIAGNOSIS — M17.12 UNILATERAL PRIMARY OSTEOARTHRITIS, LEFT KNEE: ICD-10-CM

## 2024-09-12 DIAGNOSIS — M17.11 UNILATERAL PRIMARY OSTEOARTHRITIS, RIGHT KNEE: ICD-10-CM

## 2024-09-12 PROCEDURE — 99214 OFFICE O/P EST MOD 30 MIN: CPT | Mod: 25

## 2024-09-12 PROCEDURE — 20610 DRAIN/INJ JOINT/BURSA W/O US: CPT | Mod: LT

## 2024-09-12 PROCEDURE — 73564 X-RAY EXAM KNEE 4 OR MORE: CPT | Mod: LT

## 2024-09-12 NOTE — PROCEDURE
[de-identified] : Injection: Left knee joint.  Indication: OA  A discussion was had with the patient regarding this procedure and all questions were answered. All risks, benefits and alternatives were discussed. These included but were not limited to bleeding, infection, and allergic reaction. Alcohol was used to clean the skin, and betadine was used to sterilize and prep the area in the supero-lateral aspect of the left knee. Ethyl chloride spray was then used as a topical anesthetic. A 21-gauge needle was used to inject 4cc of 1% lidocaine and 1cc of 40mg/ml methylprednisolone into the knee. A sterile bandage was then applied. The patient tolerated the procedure well and there were no complications.

## 2024-09-12 NOTE — DISCUSSION/SUMMARY
[de-identified] : 63 y/o female with left knee OA.   I discussed the treatment of degenerative arthritis with the patient at length today, as well as the chronic degenerative process and likely future progression of the disease. Pain may be related to the bony degenerative changes seen on XR imaging, or the associated degeneration to the soft tissues within the knee joint, including cartilage, meniscus, or ligamentous structures.  I described the spectrum of treatment options available including nonoperative modalities to total joint arthroplasty. Noninvasive and nonoperative treatment modalities include weight reduction, activity modification with low impact exercise, PRN use of acetaminophen or anti-inflammatory medication, natural anti-inflammatory supplements, glucosamine/chondroitin, and physical therapy (for strengthening and conditioning). More invasive treatments can include injection therapies; including cortisone, hyaluronic acid (visco-supplementation), or platelet-rich-plasma (PRP) injections. Definitive treatment could also include future total joint arthroplasty if symptoms persist and cause prolonged disability or interference with activities of daily living.   The risks and benefits of each treatment option was discussed and all questions were answered. At this time recommendations are for conservative and symptomatic care as detailed above with impact loading activity restriction, low impact exercise and avoidance of strenuous activity.   Other recommendations include OTC NSAIDs or acetaminophen (if tolerated/able), with application of ice to the knee 2-3x daily for 20 minutes or after periods of activity.  Injection therapy was provided for therapeutic and symptomatic relief.    Follow up as needed.

## 2024-09-12 NOTE — PROCEDURE
[de-identified] : Injection: Left knee joint.  Indication: OA  A discussion was had with the patient regarding this procedure and all questions were answered. All risks, benefits and alternatives were discussed. These included but were not limited to bleeding, infection, and allergic reaction. Alcohol was used to clean the skin, and betadine was used to sterilize and prep the area in the supero-lateral aspect of the left knee. Ethyl chloride spray was then used as a topical anesthetic. A 21-gauge needle was used to inject 4cc of 1% lidocaine and 1cc of 40mg/ml methylprednisolone into the knee. A sterile bandage was then applied. The patient tolerated the procedure well and there were no complications.

## 2024-09-12 NOTE — ADDENDUM
[FreeTextEntry1] : This note was written by Ese Myles on 09/12/2024 acting solely as a scribe for Dr. Patrick May.   All medical record entries made by the Scribe were at my, Dr. Patrick May, direction and personally dictated by me on 09/12/2024. I have personally reviewed the chart and agree that the record accurately reflects my personal performance of the history, physical exam, assessment and plan.

## 2024-09-12 NOTE — DISCUSSION/SUMMARY
[de-identified] : 65 y/o female with left knee OA.   I discussed the treatment of degenerative arthritis with the patient at length today, as well as the chronic degenerative process and likely future progression of the disease. Pain may be related to the bony degenerative changes seen on XR imaging, or the associated degeneration to the soft tissues within the knee joint, including cartilage, meniscus, or ligamentous structures.  I described the spectrum of treatment options available including nonoperative modalities to total joint arthroplasty. Noninvasive and nonoperative treatment modalities include weight reduction, activity modification with low impact exercise, PRN use of acetaminophen or anti-inflammatory medication, natural anti-inflammatory supplements, glucosamine/chondroitin, and physical therapy (for strengthening and conditioning). More invasive treatments can include injection therapies; including cortisone, hyaluronic acid (visco-supplementation), or platelet-rich-plasma (PRP) injections. Definitive treatment could also include future total joint arthroplasty if symptoms persist and cause prolonged disability or interference with activities of daily living.   The risks and benefits of each treatment option was discussed and all questions were answered. At this time recommendations are for conservative and symptomatic care as detailed above with impact loading activity restriction, low impact exercise and avoidance of strenuous activity.   Other recommendations include OTC NSAIDs or acetaminophen (if tolerated/able), with application of ice to the knee 2-3x daily for 20 minutes or after periods of activity.  Injection therapy was provided for therapeutic and symptomatic relief.    Follow up as needed.

## 2024-09-12 NOTE — HISTORY OF PRESENT ILLNESS
[de-identified] : 64 year old female presents today with left knee pain x 2 months. No injury reported. The pain is constant worse with prolonged standing after prolonged sitting and stair usage. Takes Tylenol with some relief. Unable to take NSAIDs due to cardiac issues. s/p right knee replacement 8/7/24. PMHX; RA

## 2024-09-12 NOTE — PHYSICAL EXAM
[de-identified] : Left knee exam  Skin: Clean, dry, intact Inspection: No obvious malalignment, no masses, no swelling, no effusion Pulses: 2+ DP/PT pulses ROM: 0-135 degrees of flexion. No pain with deep knee flexion/extension. Tenderness: No MJLT. + LJLT. No pain over the patella facets. No pain to the quadriceps tendon. No pain to the patella tendon. No posterior knee tenderness. Stability: Stable to varus, valgus. Negative lachman testing. Negative anterior drawer, negative posterior drawer. Strength: 5/5 Q/H/TA/GS/EHL, without atrophy Neuro: In tact to light touch throughout, DTR's normal Additional tests: Negative McMurrays test, + patellar grind test.  [de-identified] : The following radiographs were ordered and read by me during this patients visit. I reviewed each radiograph in detail with the patient and discussed the findings as highlighted below.   4 views of the left knee were obtained, 09/12/2024, that show no acute fracture or dislocation. There is mild medial, mild lateral and moderate patellofemoral degenerative changes seen. There is no significant malalignment. No significant other obvious osseous abnormality, otherwise unremarkable.

## 2024-09-12 NOTE — HISTORY OF PRESENT ILLNESS
[de-identified] : 64 year old female presents today with left knee pain x 2 months. No injury reported. The pain is constant worse with prolonged standing after prolonged sitting and stair usage. Takes Tylenol with some relief. Unable to take NSAIDs due to cardiac issues. s/p right knee replacement 8/7/24. PMHX; RA

## 2024-09-12 NOTE — PHYSICAL EXAM
[de-identified] : Left knee exam  Skin: Clean, dry, intact Inspection: No obvious malalignment, no masses, no swelling, no effusion Pulses: 2+ DP/PT pulses ROM: 0-135 degrees of flexion. No pain with deep knee flexion/extension. Tenderness: No MJLT. + LJLT. No pain over the patella facets. No pain to the quadriceps tendon. No pain to the patella tendon. No posterior knee tenderness. Stability: Stable to varus, valgus. Negative lachman testing. Negative anterior drawer, negative posterior drawer. Strength: 5/5 Q/H/TA/GS/EHL, without atrophy Neuro: In tact to light touch throughout, DTR's normal Additional tests: Negative McMurrays test, + patellar grind test.  [de-identified] : The following radiographs were ordered and read by me during this patients visit. I reviewed each radiograph in detail with the patient and discussed the findings as highlighted below.   4 views of the left knee were obtained, 09/12/2024, that show no acute fracture or dislocation. There is mild medial, mild lateral and moderate patellofemoral degenerative changes seen. There is no significant malalignment. No significant other obvious osseous abnormality, otherwise unremarkable.

## 2024-11-12 ENCOUNTER — APPOINTMENT (OUTPATIENT)
Dept: ORTHOPEDIC SURGERY | Facility: CLINIC | Age: 64
End: 2024-11-12
Payer: COMMERCIAL

## 2024-11-12 VITALS — WEIGHT: 180 LBS | BODY MASS INDEX: 33.13 KG/M2 | HEIGHT: 62 IN

## 2024-11-12 DIAGNOSIS — M17.12 UNILATERAL PRIMARY OSTEOARTHRITIS, LEFT KNEE: ICD-10-CM

## 2024-11-12 PROCEDURE — 99215 OFFICE O/P EST HI 40 MIN: CPT

## 2024-11-12 PROCEDURE — 73562 X-RAY EXAM OF KNEE 3: CPT | Mod: LT

## 2025-01-08 ENCOUNTER — APPOINTMENT (OUTPATIENT)
Dept: OBGYN | Facility: CLINIC | Age: 65
End: 2025-01-08
Payer: COMMERCIAL

## 2025-01-08 PROCEDURE — 81002 URINALYSIS NONAUTO W/O SCOPE: CPT

## 2025-01-08 PROCEDURE — 99396 PREV VISIT EST AGE 40-64: CPT

## 2025-01-28 ENCOUNTER — APPOINTMENT (OUTPATIENT)
Dept: ORTHOPEDIC SURGERY | Facility: CLINIC | Age: 65
End: 2025-01-28
Payer: COMMERCIAL

## 2025-01-28 VITALS
WEIGHT: 180 LBS | SYSTOLIC BLOOD PRESSURE: 124 MMHG | HEIGHT: 62 IN | HEART RATE: 62 BPM | BODY MASS INDEX: 33.13 KG/M2 | DIASTOLIC BLOOD PRESSURE: 74 MMHG

## 2025-01-28 DIAGNOSIS — M24.172 OTHER ARTICULAR CARTILAGE DISORDERS, LEFT ANKLE: ICD-10-CM

## 2025-01-28 DIAGNOSIS — M25.872 OTHER SPECIFIED JOINT DISORDERS, LEFT ANKLE AND FOOT: ICD-10-CM

## 2025-01-28 DIAGNOSIS — M21.42 FLAT FOOT [PES PLANUS] (ACQUIRED), LEFT FOOT: ICD-10-CM

## 2025-01-28 DIAGNOSIS — M62.462 CONTRACTURE OF MUSCLE, LEFT LOWER LEG: ICD-10-CM

## 2025-01-28 DIAGNOSIS — S96.912A STRAIN OF UNSPECIFIED MUSCLE AND TENDON AT ANKLE AND FOOT LEVEL, LEFT FOOT, INITIAL ENCOUNTER: ICD-10-CM

## 2025-01-28 DIAGNOSIS — S99.912A UNSPECIFIED INJURY OF LEFT ANKLE, INITIAL ENCOUNTER: ICD-10-CM

## 2025-01-28 PROCEDURE — 73600 X-RAY EXAM OF ANKLE: CPT | Mod: LT

## 2025-01-28 PROCEDURE — 73620 X-RAY EXAM OF FOOT: CPT | Mod: LT

## 2025-01-28 PROCEDURE — 99214 OFFICE O/P EST MOD 30 MIN: CPT

## 2025-02-03 PROBLEM — M25.872 IMPINGEMENT OF LEFT ANKLE JOINT: Status: ACTIVE | Noted: 2025-01-28

## 2025-02-03 PROBLEM — S99.912A ANKLE INJURY, LEFT, INITIAL ENCOUNTER: Status: ACTIVE | Noted: 2025-02-03

## 2025-02-03 PROBLEM — M24.172 DERANGEMENT OF ANKLE, LEFT: Status: ACTIVE | Noted: 2025-02-03

## 2025-02-03 PROBLEM — S96.912A STRAIN OF LEFT ANKLE, INITIAL ENCOUNTER: Status: ACTIVE | Noted: 2025-02-03

## 2025-02-03 PROBLEM — M62.462 GASTROCNEMIUS EQUINUS OF LEFT LOWER EXTREMITY: Status: ACTIVE | Noted: 2025-02-03

## 2025-02-03 PROBLEM — M21.42 ACQUIRED PES PLANUS OF LEFT FOOT: Status: ACTIVE | Noted: 2025-02-03

## 2025-03-19 ENCOUNTER — APPOINTMENT (OUTPATIENT)
Dept: ORTHOPEDIC SURGERY | Facility: CLINIC | Age: 65
End: 2025-03-19
Payer: MEDICARE

## 2025-03-19 VITALS — BODY MASS INDEX: 33.13 KG/M2 | WEIGHT: 180 LBS | HEIGHT: 62 IN

## 2025-03-19 DIAGNOSIS — M25.872 OTHER SPECIFIED JOINT DISORDERS, LEFT ANKLE AND FOOT: ICD-10-CM

## 2025-03-19 DIAGNOSIS — M24.172 OTHER ARTICULAR CARTILAGE DISORDERS, LEFT ANKLE: ICD-10-CM

## 2025-03-19 DIAGNOSIS — M21.42 FLAT FOOT [PES PLANUS] (ACQUIRED), LEFT FOOT: ICD-10-CM

## 2025-03-19 PROCEDURE — 99203 OFFICE O/P NEW LOW 30 MIN: CPT

## 2025-03-27 ENCOUNTER — OUTPATIENT (OUTPATIENT)
Dept: OUTPATIENT SERVICES | Facility: HOSPITAL | Age: 65
LOS: 1 days | End: 2025-03-27
Payer: MEDICARE

## 2025-03-27 ENCOUNTER — APPOINTMENT (OUTPATIENT)
Dept: MRI IMAGING | Facility: CLINIC | Age: 65
End: 2025-03-27
Payer: MEDICARE

## 2025-03-27 DIAGNOSIS — S42.309A UNSPECIFIED FRACTURE OF SHAFT OF HUMERUS, UNSPECIFIED ARM, INITIAL ENCOUNTER FOR CLOSED FRACTURE: Chronic | ICD-10-CM

## 2025-03-27 DIAGNOSIS — Z98.890 OTHER SPECIFIED POSTPROCEDURAL STATES: Chronic | ICD-10-CM

## 2025-03-27 DIAGNOSIS — M24.172 OTHER ARTICULAR CARTILAGE DISORDERS, LEFT ANKLE: ICD-10-CM

## 2025-03-27 DIAGNOSIS — Z98.891 HISTORY OF UTERINE SCAR FROM PREVIOUS SURGERY: Chronic | ICD-10-CM

## 2025-03-27 PROCEDURE — 73721 MRI JNT OF LWR EXTRE W/O DYE: CPT

## 2025-03-27 PROCEDURE — 73721 MRI JNT OF LWR EXTRE W/O DYE: CPT | Mod: 26,LT

## 2025-04-22 ENCOUNTER — NON-APPOINTMENT (OUTPATIENT)
Age: 65
End: 2025-04-22

## 2025-04-23 ENCOUNTER — NON-APPOINTMENT (OUTPATIENT)
Age: 65
End: 2025-04-23

## 2025-04-23 ENCOUNTER — APPOINTMENT (OUTPATIENT)
Dept: ORTHOPEDIC SURGERY | Facility: CLINIC | Age: 65
End: 2025-04-23
Payer: MEDICARE

## 2025-04-23 VITALS — WEIGHT: 180 LBS | BODY MASS INDEX: 33.13 KG/M2 | HEIGHT: 62 IN

## 2025-04-23 DIAGNOSIS — S96.912S STRAIN OF UNSPECIFIED MUSCLE AND TENDON AT ANKLE AND FOOT LEVEL, LEFT FOOT, SEQUELA: ICD-10-CM

## 2025-04-23 DIAGNOSIS — M67.88 OTHER SPECIFIED DISORDERS OF SYNOVIUM AND TENDON, OTHER SITE: ICD-10-CM

## 2025-04-23 DIAGNOSIS — M62.462 CONTRACTURE OF MUSCLE, LEFT LOWER LEG: ICD-10-CM

## 2025-04-23 DIAGNOSIS — M21.42 FLAT FOOT [PES PLANUS] (ACQUIRED), LEFT FOOT: ICD-10-CM

## 2025-04-23 DIAGNOSIS — M25.872 OTHER SPECIFIED JOINT DISORDERS, LEFT ANKLE AND FOOT: ICD-10-CM

## 2025-04-23 DIAGNOSIS — M24.172 OTHER ARTICULAR CARTILAGE DISORDERS, LEFT ANKLE: ICD-10-CM

## 2025-04-23 PROCEDURE — 99213 OFFICE O/P EST LOW 20 MIN: CPT

## (undated) DEVICE — VENODYNE/SCD SLEEVE CALF LARGE

## (undated) DEVICE — SUT POLYSORB 1 36" GS-21 UNDYED

## (undated) DEVICE — SPECIMEN CONTAINER 100ML

## (undated) DEVICE — GLV 8.5 PROTEXIS (WHITE)

## (undated) DEVICE — TOURNIQUET CUFF 34" DUAL PORT W PLC

## (undated) DEVICE — SUT POLYSORB 2-0 30" GS-21 UNDYED

## (undated) DEVICE — WOUND IRR SURGIPHOR

## (undated) DEVICE — SYR LUER LOK 20CC

## (undated) DEVICE — TUBING SUCTION 20FT

## (undated) DEVICE — SUT PDO 2 1/2 CIRCLE 40MM NDL 45CM

## (undated) DEVICE — SAW BLADE STRYKER SAGITTAL DUAL CUT 64X35X.89MM

## (undated) DEVICE — SOL IRR POUR NS 0.9% 500ML

## (undated) DEVICE — WARMING BLANKET UPPER ADULT

## (undated) DEVICE — TIBIAL PREP SINGLE USE SIZE 3

## (undated) DEVICE — WOUND IRR IRRISEPT W 0.5 CHG

## (undated) DEVICE — MAKO BLADE STANDARD

## (undated) DEVICE — SUT QUILL MONODERM 2-0 3/8 CIRCLE 45CM

## (undated) DEVICE — ELCTR PLASMA BLADE X 3.0S WIDE TIP

## (undated) DEVICE — DRAPE 3/4 SHEET W REINFORCEMENT 56X77"

## (undated) DEVICE — POSITIONER CARDIAC BUMP

## (undated) DEVICE — NDL HYPO SAFE 22G X 1.5" (BLACK)

## (undated) DEVICE — SAW BLADE STRYKER SAGITTAL EXTRA WIDE THIN SHORT

## (undated) DEVICE — MAKO DRAPE KIT

## (undated) DEVICE — GLV 8 PROTEXIS (WHITE)

## (undated) DEVICE — SOL IRR POUR H2O 1000ML

## (undated) DEVICE — GLV 7.5 PROTEXIS (WHITE)

## (undated) DEVICE — DRSG DERMABOND 0.7ML

## (undated) DEVICE — DRSG AQUACEL 3.5 X 10"

## (undated) DEVICE — PACK TOTAL KNEE (2 PACKS)

## (undated) DEVICE — MAKO VIZADISC KNEE TRACKING KIT

## (undated) DEVICE — POSITIONER FOAM EGG CRATE ULNAR 2PCS (PINK)

## (undated) DEVICE — HOOD FLYTE STRYKER HELMET SHIELD

## (undated) DEVICE — FEMORAL PREP SINGLE USE SZ2